# Patient Record
Sex: FEMALE | Race: WHITE | NOT HISPANIC OR LATINO
[De-identification: names, ages, dates, MRNs, and addresses within clinical notes are randomized per-mention and may not be internally consistent; named-entity substitution may affect disease eponyms.]

---

## 2018-10-04 ENCOUNTER — APPOINTMENT (OUTPATIENT)
Dept: BREAST CENTER | Facility: CLINIC | Age: 73
End: 2018-10-04
Payer: MEDICARE

## 2018-10-04 VITALS
HEART RATE: 66 BPM | WEIGHT: 217 LBS | DIASTOLIC BLOOD PRESSURE: 73 MMHG | HEIGHT: 61 IN | BODY MASS INDEX: 40.97 KG/M2 | SYSTOLIC BLOOD PRESSURE: 156 MMHG

## 2018-10-04 DIAGNOSIS — Z83.3 FAMILY HISTORY OF DIABETES MELLITUS: ICD-10-CM

## 2018-10-04 DIAGNOSIS — Z83.1 FAMILY HISTORY OF OTHER INFECTIOUS AND PARASITIC DISEASES: ICD-10-CM

## 2018-10-04 DIAGNOSIS — Z86.79 PERSONAL HISTORY OF OTHER DISEASES OF THE CIRCULATORY SYSTEM: ICD-10-CM

## 2018-10-04 DIAGNOSIS — Z80.51 FAMILY HISTORY OF MALIGNANT NEOPLASM OF KIDNEY: ICD-10-CM

## 2018-10-04 DIAGNOSIS — Z87.891 PERSONAL HISTORY OF NICOTINE DEPENDENCE: ICD-10-CM

## 2018-10-04 DIAGNOSIS — Z12.31 ENCOUNTER FOR SCREENING MAMMOGRAM FOR MALIGNANT NEOPLASM OF BREAST: ICD-10-CM

## 2018-10-04 DIAGNOSIS — Z80.8 FAMILY HISTORY OF MALIGNANT NEOPLASM OF OTHER ORGANS OR SYSTEMS: ICD-10-CM

## 2018-10-04 PROCEDURE — 99214 OFFICE O/P EST MOD 30 MIN: CPT

## 2018-10-04 RX ORDER — LOPERAMIDE HCL 2 MG
2 CAPSULE ORAL
Refills: 0 | Status: ACTIVE | COMMUNITY

## 2018-10-04 RX ORDER — LABETALOL HYDROCHLORIDE 300 MG/1
TABLET, FILM COATED ORAL
Refills: 0 | Status: ACTIVE | COMMUNITY

## 2019-04-04 ENCOUNTER — APPOINTMENT (OUTPATIENT)
Dept: BREAST CENTER | Facility: CLINIC | Age: 74
End: 2019-04-04
Payer: MEDICARE

## 2019-04-04 VITALS
BODY MASS INDEX: 40.59 KG/M2 | HEIGHT: 61 IN | HEART RATE: 75 BPM | DIASTOLIC BLOOD PRESSURE: 75 MMHG | WEIGHT: 215 LBS | SYSTOLIC BLOOD PRESSURE: 152 MMHG

## 2019-04-04 PROCEDURE — 99214 OFFICE O/P EST MOD 30 MIN: CPT

## 2019-04-04 RX ORDER — CHOLECALCIFEROL (VITAMIN D3) 25 MCG
TABLET ORAL
Refills: 0 | Status: ACTIVE | COMMUNITY

## 2019-04-04 RX ORDER — METFORMIN HYDROCHLORIDE 625 MG/1
TABLET ORAL
Refills: 0 | Status: ACTIVE | COMMUNITY

## 2019-04-04 RX ORDER — IBUPROFEN 200 MG/1
200 TABLET, COATED ORAL
Refills: 0 | Status: DISCONTINUED | COMMUNITY
End: 2019-04-04

## 2019-04-04 RX ORDER — IRON/IRON ASP GLY/FA/MV-MIN 38 125-25-1MG
TABLET ORAL
Refills: 0 | Status: ACTIVE | COMMUNITY

## 2019-04-04 RX ORDER — MENTHOL 5.8 MG/1
LOZENGE ORAL
Refills: 0 | Status: ACTIVE | COMMUNITY

## 2019-04-04 RX ORDER — ATORVASTATIN CALCIUM 80 MG/1
TABLET, FILM COATED ORAL
Refills: 0 | Status: ACTIVE | COMMUNITY

## 2019-04-04 RX ORDER — BIOTIN 10 MG
TABLET ORAL
Refills: 0 | Status: ACTIVE | COMMUNITY

## 2019-04-04 RX ORDER — SITAGLIPTIN 100 MG/1
100 TABLET, FILM COATED ORAL
Refills: 0 | Status: DISCONTINUED | COMMUNITY
End: 2019-04-04

## 2019-04-04 NOTE — HISTORY OF PRESENT ILLNESS
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 Diarrhea improved on Imodium\par Saw Bucky Bai/Serena > Pt decided against V. Hernia repair\par Had extensive w/u for elevated tumor marker (?) > colonoscopy/Abd CT/MRI > dil pancreatic duct > followed\par S/P Thyroid FNA (9/27/18): Benign\par No other MH/FH changes. ROS reviewed/discussed. Taking Ca/Vit D/Fe. Bone Densitometry (2015): "WNL"\par Mammo/Sono (8/20/18): NSF

## 2019-04-04 NOTE — PHYSICAL EXAM
[Normocephalic] : normocephalic [Atraumatic] : atraumatic [Supple] : supple [No Supraclavicular Adenopathy] : no supraclavicular adenopathy [No Cervical Adenopathy] : no cervical adenopathy [Normal Sinus Rhythm] : normal sinus rhythm [Examined in the supine and seated position] : examined in the supine and seated position [No dominant masses] : no dominant masses in right breast  [No dominant masses] : no dominant masses left breast [No Nipple Retraction] : no left nipple retraction [No Nipple Discharge] : no left nipple discharge [No Axillary Lymphadenopathy] : no left axillary lymphadenopathy [No Edema] : no edema [No Rashes] : no rashes [No Ulceration] : no ulceration [de-identified] : +R thyroid mass > eval'ed > benign [de-identified] : PMX/SLN/RT. NER. %. No lymphedema.  [de-identified] : +large reducible v. hernia

## 2019-10-10 ENCOUNTER — APPOINTMENT (OUTPATIENT)
Dept: BREAST CENTER | Facility: CLINIC | Age: 74
End: 2019-10-10
Payer: MEDICARE

## 2019-10-10 VITALS
BODY MASS INDEX: 39.65 KG/M2 | HEART RATE: 63 BPM | DIASTOLIC BLOOD PRESSURE: 57 MMHG | HEIGHT: 61 IN | WEIGHT: 210 LBS | SYSTOLIC BLOOD PRESSURE: 135 MMHG

## 2019-10-10 DIAGNOSIS — E04.1 NONTOXIC SINGLE THYROID NODULE: ICD-10-CM

## 2019-10-10 PROCEDURE — 99214 OFFICE O/P EST MOD 30 MIN: CPT

## 2019-10-10 NOTE — PHYSICAL EXAM
[Normocephalic] : normocephalic [Atraumatic] : atraumatic [No Supraclavicular Adenopathy] : no supraclavicular adenopathy [Supple] : supple [No Cervical Adenopathy] : no cervical adenopathy [Normal Sinus Rhythm] : normal sinus rhythm [Examined in the supine and seated position] : examined in the supine and seated position [No dominant masses] : no dominant masses in right breast  [No dominant masses] : no dominant masses left breast [No Nipple Retraction] : no left nipple retraction [No Nipple Discharge] : no left nipple discharge [No Axillary Lymphadenopathy] : no left axillary lymphadenopathy [No Edema] : no edema [No Rashes] : no rashes [No Ulceration] : no ulceration [de-identified] : S/P PMX/SLN/RT. NER. %. No lymphedema.  [de-identified] : +ssame large red v. hernia

## 2019-10-10 NOTE — HISTORY OF PRESENT ILLNESS
[FreeTextEntry1] : S/P L PMX (6/22/10)(Dina): +1.4cm IDCA, SBR I, -LVI, -margins, \par S/P L SLN (7/2/10): -0/5 LN\par L Stage IA IDCA\par S/P RT ([place] [facility])\par S/P Arimidex x 5 yrs ([facility])\par S/P perf AP (6/16) req R colectomy. Diarrhea improved on Imodium\par Saw Bucky Bai/Serena > Pt decided against V. Hernia repair\par Had extensive w/u for elevated tumor marker (?) > colonoscopy/Abd CT/MRI > dil pancreatic duct > followed\par S/P Thyroid FNA (9/27/18): Benign\par No other MH/FH changes. ROS reviewed/discussed. Taking Ca/Vit D/Fe. Bone Densitometry (2015): "WNL"\par Mammo/Sono (9/4/19): HD, NSF

## 2020-04-23 ENCOUNTER — APPOINTMENT (OUTPATIENT)
Dept: BREAST CENTER | Facility: CLINIC | Age: 75
End: 2020-04-23

## 2020-08-05 ENCOUNTER — APPOINTMENT (OUTPATIENT)
Dept: BREAST CENTER | Facility: CLINIC | Age: 75
End: 2020-08-05
Payer: MEDICARE

## 2020-08-05 ENCOUNTER — TRANSCRIPTION ENCOUNTER (OUTPATIENT)
Age: 75
End: 2020-08-05

## 2020-08-05 VITALS
BODY MASS INDEX: 41.23 KG/M2 | WEIGHT: 210 LBS | SYSTOLIC BLOOD PRESSURE: 150 MMHG | DIASTOLIC BLOOD PRESSURE: 76 MMHG | HEART RATE: 68 BPM | HEIGHT: 60 IN

## 2020-08-05 DIAGNOSIS — N64.4 MASTODYNIA: ICD-10-CM

## 2020-08-05 DIAGNOSIS — R92.2 INCONCLUSIVE MAMMOGRAM: ICD-10-CM

## 2020-08-05 DIAGNOSIS — K43.9 VENTRAL HERNIA W/OUT OBSTRUCTION OR GANGRENE: ICD-10-CM

## 2020-08-05 DIAGNOSIS — Z80.3 FAMILY HISTORY OF MALIGNANT NEOPLASM OF BREAST: ICD-10-CM

## 2020-08-05 DIAGNOSIS — C50.912 MALIGNANT NEOPLASM OF UNSPECIFIED SITE OF LEFT FEMALE BREAST: ICD-10-CM

## 2020-08-05 PROCEDURE — 99214 OFFICE O/P EST MOD 30 MIN: CPT

## 2020-08-05 RX ORDER — VITAMIN B COMPLEX
CAPSULE ORAL
Refills: 0 | Status: DISCONTINUED | COMMUNITY
End: 2020-08-05

## 2020-08-05 RX ORDER — REPAGLINIDE 1 MG/1
TABLET ORAL
Refills: 0 | Status: ACTIVE | COMMUNITY

## 2020-08-05 RX ORDER — OXYBUTYNIN CHLORIDE 2.5 MG/1
TABLET ORAL
Refills: 0 | Status: ACTIVE | COMMUNITY

## 2020-08-05 RX ORDER — PNV NO.95/FERROUS FUM/FOLIC AC 28MG-0.8MG
TABLET ORAL
Refills: 0 | Status: DISCONTINUED | COMMUNITY
End: 2020-08-05

## 2020-08-05 RX ORDER — OLMESARTAN MEDOXOMIL 20 MG/1
20 TABLET, FILM COATED ORAL
Refills: 0 | Status: DISCONTINUED | COMMUNITY
End: 2020-08-05

## 2020-08-05 RX ORDER — TELMISARTAN 40 MG/1
40 TABLET ORAL
Refills: 0 | Status: ACTIVE | COMMUNITY

## 2020-08-05 RX ORDER — VITAMIN E ACID SUCCINATE 268 MG
TABLET ORAL
Refills: 0 | Status: DISCONTINUED | COMMUNITY
End: 2020-08-05

## 2020-08-05 RX ORDER — BLACK COHOSH ROOT 200 MG
CAPSULE ORAL
Refills: 0 | Status: DISCONTINUED | COMMUNITY
End: 2020-08-05

## 2020-08-05 NOTE — PHYSICAL EXAM
[Normocephalic] : normocephalic [Atraumatic] : atraumatic [Supple] : supple [No Supraclavicular Adenopathy] : no supraclavicular adenopathy [No Cervical Adenopathy] : no cervical adenopathy [No Thyromegaly] : no thyromegaly [Normal Sinus Rhythm] : normal sinus rhythm [Examined in the supine and seated position] : examined in the supine and seated position [No dominant masses] : no dominant masses in right breast  [No dominant masses] : no dominant masses left breast [No Nipple Retraction] : no left nipple retraction [No Nipple Discharge] : no left nipple discharge [No Axillary Lymphadenopathy] : no left axillary lymphadenopathy [No Rashes] : no rashes [No Edema] : no edema [No Ulceration] : no ulceration [de-identified] : S/P PMX/SLN/RT. NER. %. No lymphedema.

## 2020-08-05 NOTE — HISTORY OF PRESENT ILLNESS
[FreeTextEntry1] : Pt RTO w/ c/o L Br Pain, int x 2 wks\par S/P L PMX (6/22/10)(Dina): +1.4cm IDCA, SBR I, -LVI, -margins, \par S/P L SLN (7/2/10): -0/5 LN\par L Stage IA IDCA\par S/P RT ([facility])\par S/P Arimidex x 5 yrs ([facility] [place])\par S/P perf AP (6/16) req R colectomy. Diarrhea improved on Imodium\par Saw Drs Richardson/Serena > Pt decided against V. Hernia repair\par Had extensive w/u for elevated tumor marker (?) > colonoscopy/Abd CT/MRI > dil pancreatic duct > followed\par S/P Thyroid FNA (9/27/18): Benign\par No other MH/FH changes. ROS reviewed/discussed. Taking Ca/Vit D/Fe. Bone Densitometry (2015): "WNL"\par Mammo/Sono (9/4/19): HD, NSF

## 2021-02-17 ENCOUNTER — NON-APPOINTMENT (OUTPATIENT)
Age: 76
End: 2021-02-17

## 2021-02-18 ENCOUNTER — APPOINTMENT (OUTPATIENT)
Dept: BREAST CENTER | Facility: CLINIC | Age: 76
End: 2021-02-18

## 2024-10-17 ENCOUNTER — APPOINTMENT (OUTPATIENT)
Dept: NEUROSURGERY | Facility: CLINIC | Age: 79
End: 2024-10-17
Payer: MEDICARE

## 2024-10-17 ENCOUNTER — NON-APPOINTMENT (OUTPATIENT)
Age: 79
End: 2024-10-17

## 2024-10-17 VITALS
HEIGHT: 60 IN | OXYGEN SATURATION: 98 % | HEART RATE: 91 BPM | RESPIRATION RATE: 16 BRPM | WEIGHT: 210 LBS | BODY MASS INDEX: 41.23 KG/M2 | TEMPERATURE: 97.5 F | DIASTOLIC BLOOD PRESSURE: 95 MMHG | SYSTOLIC BLOOD PRESSURE: 127 MMHG

## 2024-10-17 DIAGNOSIS — M47.816 SPONDYLOSIS W/OUT MYELOPATHY OR RADICULOPATHY, LUMBAR REGION: ICD-10-CM

## 2024-10-17 PROCEDURE — 99203 OFFICE O/P NEW LOW 30 MIN: CPT

## 2024-11-21 ENCOUNTER — APPOINTMENT (OUTPATIENT)
Dept: NEUROSURGERY | Facility: CLINIC | Age: 79
End: 2024-11-21
Payer: MEDICARE

## 2024-11-21 VITALS
DIASTOLIC BLOOD PRESSURE: 95 MMHG | TEMPERATURE: 97.5 F | BODY MASS INDEX: 41.23 KG/M2 | RESPIRATION RATE: 16 BRPM | HEIGHT: 60 IN | WEIGHT: 210 LBS | SYSTOLIC BLOOD PRESSURE: 127 MMHG | HEART RATE: 91 BPM | OXYGEN SATURATION: 98 %

## 2024-11-21 DIAGNOSIS — M47.816 SPONDYLOSIS W/OUT MYELOPATHY OR RADICULOPATHY, LUMBAR REGION: ICD-10-CM

## 2024-11-21 PROCEDURE — 99214 OFFICE O/P EST MOD 30 MIN: CPT

## 2025-01-23 VITALS
SYSTOLIC BLOOD PRESSURE: 152 MMHG | OXYGEN SATURATION: 99 % | DIASTOLIC BLOOD PRESSURE: 76 MMHG | WEIGHT: 196.21 LBS | TEMPERATURE: 98 F | HEART RATE: 59 BPM | HEIGHT: 60 IN | RESPIRATION RATE: 16 BRPM

## 2025-01-23 NOTE — PATIENT PROFILE ADULT - FALL HARM RISK - FACTORS NURSING JUDGEMENT
influenza, injectable, quadrivalent, preservative free; 18-Dec-2020 13:35; Pedro Calix (RN); [employer]em; [id number] (Exp. Date: 30-Jun-2021); IntraMuscular; Deltoid Left.; 0.5 milliLiter(s); VIS (VIS Published: 15-Aug-2019, VIS Presented: 18-Dec-2020);    Yes

## 2025-01-23 NOTE — PATIENT PROFILE ADULT - FALL HARM RISK - HARM RISK INTERVENTIONS

## 2025-01-24 ENCOUNTER — INPATIENT (INPATIENT)
Facility: HOSPITAL | Age: 80
LOS: 5 days | Discharge: NURSING, REHAB OR LONG-TERM CARE | End: 2025-01-30
Attending: NEUROLOGICAL SURGERY | Admitting: NEUROLOGICAL SURGERY
Payer: MEDICARE

## 2025-01-24 ENCOUNTER — APPOINTMENT (OUTPATIENT)
Dept: NEUROSURGERY | Facility: HOSPITAL | Age: 80
End: 2025-01-24

## 2025-01-24 ENCOUNTER — TRANSCRIPTION ENCOUNTER (OUTPATIENT)
Age: 80
End: 2025-01-24

## 2025-01-24 DIAGNOSIS — R93.3 ABNORMAL FINDINGS ON DIAGNOSTIC IMAGING OF OTHER PARTS OF DIGESTIVE TRACT: Chronic | ICD-10-CM

## 2025-01-24 DIAGNOSIS — Z90.49 ACQUIRED ABSENCE OF OTHER SPECIFIED PARTS OF DIGESTIVE TRACT: Chronic | ICD-10-CM

## 2025-01-24 DIAGNOSIS — Z98.890 OTHER SPECIFIED POSTPROCEDURAL STATES: Chronic | ICD-10-CM

## 2025-01-24 LAB
ADD ON TEST-SPECIMEN IN LAB: SIGNIFICANT CHANGE UP
ANION GAP SERPL CALC-SCNC: 10 MMOL/L — SIGNIFICANT CHANGE UP (ref 5–17)
BASOPHILS # BLD AUTO: 0.01 K/UL — SIGNIFICANT CHANGE UP (ref 0–0.2)
BASOPHILS NFR BLD AUTO: 0.2 % — SIGNIFICANT CHANGE UP (ref 0–2)
BLD GP AB SCN SERPL QL: NEGATIVE — SIGNIFICANT CHANGE UP
BUN SERPL-MCNC: 26 MG/DL — HIGH (ref 7–23)
CALCIUM SERPL-MCNC: 8.5 MG/DL — SIGNIFICANT CHANGE UP (ref 8.4–10.5)
CHLORIDE SERPL-SCNC: 108 MMOL/L — SIGNIFICANT CHANGE UP (ref 96–108)
CO2 SERPL-SCNC: 22 MMOL/L — SIGNIFICANT CHANGE UP (ref 22–31)
CREAT SERPL-MCNC: 1.16 MG/DL — SIGNIFICANT CHANGE UP (ref 0.5–1.3)
EGFR: 48 ML/MIN/1.73M2 — LOW
EOSINOPHIL # BLD AUTO: 0.02 K/UL — SIGNIFICANT CHANGE UP (ref 0–0.5)
EOSINOPHIL NFR BLD AUTO: 0.4 % — SIGNIFICANT CHANGE UP (ref 0–6)
GLUCOSE SERPL-MCNC: 185 MG/DL — HIGH (ref 70–99)
HCT VFR BLD CALC: 29.1 % — LOW (ref 34.5–45)
HGB BLD-MCNC: 9.5 G/DL — LOW (ref 11.5–15.5)
IMM GRANULOCYTES NFR BLD AUTO: 1.1 % — HIGH (ref 0–0.9)
LYMPHOCYTES # BLD AUTO: 0.7 K/UL — LOW (ref 1–3.3)
LYMPHOCYTES # BLD AUTO: 12.7 % — LOW (ref 13–44)
MCHC RBC-ENTMCNC: 26.3 PG — LOW (ref 27–34)
MCHC RBC-ENTMCNC: 32.6 G/DL — SIGNIFICANT CHANGE UP (ref 32–36)
MCV RBC AUTO: 80.6 FL — SIGNIFICANT CHANGE UP (ref 80–100)
MONOCYTES # BLD AUTO: 0.08 K/UL — SIGNIFICANT CHANGE UP (ref 0–0.9)
MONOCYTES NFR BLD AUTO: 1.4 % — LOW (ref 2–14)
NEUTROPHILS # BLD AUTO: 4.66 K/UL — SIGNIFICANT CHANGE UP (ref 1.8–7.4)
NEUTROPHILS NFR BLD AUTO: 84.2 % — HIGH (ref 43–77)
NRBC # BLD: 0 /100 WBCS — SIGNIFICANT CHANGE UP (ref 0–0)
NRBC BLD-RTO: 0 /100 WBCS — SIGNIFICANT CHANGE UP (ref 0–0)
PLATELET # BLD AUTO: 182 K/UL — SIGNIFICANT CHANGE UP (ref 150–400)
POTASSIUM SERPL-MCNC: 4 MMOL/L — SIGNIFICANT CHANGE UP (ref 3.5–5.3)
POTASSIUM SERPL-SCNC: 4 MMOL/L — SIGNIFICANT CHANGE UP (ref 3.5–5.3)
RBC # BLD: 3.61 M/UL — LOW (ref 3.8–5.2)
RBC # FLD: 14.1 % — SIGNIFICANT CHANGE UP (ref 10.3–14.5)
RH IG SCN BLD-IMP: POSITIVE — SIGNIFICANT CHANGE UP
SODIUM SERPL-SCNC: 140 MMOL/L — SIGNIFICANT CHANGE UP (ref 135–145)
WBC # BLD: 5.53 K/UL — SIGNIFICANT CHANGE UP (ref 3.8–10.5)
WBC # FLD AUTO: 5.53 K/UL — SIGNIFICANT CHANGE UP (ref 3.8–10.5)

## 2025-01-24 PROCEDURE — 22633 ARTHRD CMBN 1NTRSPC LUMBAR: CPT | Mod: 80

## 2025-01-24 PROCEDURE — 63052 LAM FACETC/FRMT ARTHRD LUM 1: CPT | Mod: 59

## 2025-01-24 PROCEDURE — 63047 LAM FACETEC & FORAMOT LUMBAR: CPT | Mod: 59

## 2025-01-24 PROCEDURE — 22853 INSJ BIOMECHANICAL DEVICE: CPT

## 2025-01-24 PROCEDURE — 63052 LAM FACETC/FRMT ARTHRD LUM 1: CPT | Mod: 80,59

## 2025-01-24 PROCEDURE — 22840 INSERT SPINE FIXATION DEVICE: CPT | Mod: 80

## 2025-01-24 PROCEDURE — 22633 ARTHRD CMBN 1NTRSPC LUMBAR: CPT

## 2025-01-24 PROCEDURE — 22840 INSERT SPINE FIXATION DEVICE: CPT

## 2025-01-24 PROCEDURE — 63047 LAM FACETEC & FORAMOT LUMBAR: CPT | Mod: 80,59

## 2025-01-24 PROCEDURE — 61783 SCAN PROC SPINAL: CPT | Mod: 59

## 2025-01-24 PROCEDURE — 22853 INSJ BIOMECHANICAL DEVICE: CPT | Mod: 80

## 2025-01-24 DEVICE — ROD CONTOURED 5.5X40MM: Type: IMPLANTABLE DEVICE | Status: FUNCTIONAL

## 2025-01-24 DEVICE — CAGE HYPERLORDATIC FORZA 8.5X10X24MM: Type: IMPLANTABLE DEVICE | Status: FUNCTIONAL

## 2025-01-24 DEVICE — SURGIFLO HEMOSTATIC MATRIX KIT: Type: IMPLANTABLE DEVICE | Status: FUNCTIONAL

## 2025-01-24 DEVICE — SURGIFOAM PAD 2CM X 6CM X 7MM (12-7): Type: IMPLANTABLE DEVICE | Status: FUNCTIONAL

## 2025-01-24 DEVICE — SET SCREW EVEREST ATR: Type: IMPLANTABLE DEVICE | Status: FUNCTIONAL

## 2025-01-24 DEVICE — SCREW EVEREST POLY 6.5X40MM: Type: IMPLANTABLE DEVICE | Status: FUNCTIONAL

## 2025-01-24 RX ORDER — HYDROCHLOROTHIAZIDE 50 MG
1 TABLET ORAL
Refills: 0 | DISCHARGE

## 2025-01-24 RX ORDER — ACETAMINOPHEN 160 MG/5ML
2 SUSPENSION ORAL
Refills: 0 | DISCHARGE

## 2025-01-24 RX ORDER — LOSARTAN POTASSIUM 100 MG
100 TABLET ORAL DAILY
Refills: 0 | Status: DISCONTINUED | OUTPATIENT
Start: 2025-01-24 | End: 2025-01-25

## 2025-01-24 RX ORDER — METFORMIN HYDROCHLORIDE 1000 MG/1
1 TABLET, COATED ORAL
Refills: 0 | DISCHARGE

## 2025-01-24 RX ORDER — PREGABALIN CAPSULES, CV 225 MG/1
50 CAPSULE ORAL THREE TIMES A DAY
Refills: 0 | Status: DISCONTINUED | OUTPATIENT
Start: 2025-01-24 | End: 2025-01-27

## 2025-01-24 RX ORDER — HYDROMORPHONE HYDROCHLORIDE 4 MG/ML
0.5 INJECTION, SOLUTION INTRAMUSCULAR; INTRAVENOUS; SUBCUTANEOUS EVERY 4 HOURS
Refills: 0 | Status: DISCONTINUED | OUTPATIENT
Start: 2025-01-24 | End: 2025-01-27

## 2025-01-24 RX ORDER — DM/PSEUDOEPHED/ACETAMINOPHEN 10-30-250
12.5 CAPSULE ORAL ONCE
Refills: 0 | Status: DISCONTINUED | OUTPATIENT
Start: 2025-01-24 | End: 2025-01-25

## 2025-01-24 RX ORDER — SODIUM CHLORIDE 9 G/ML
1000 INJECTION, SOLUTION INTRAVENOUS
Refills: 0 | Status: DISCONTINUED | OUTPATIENT
Start: 2025-01-24 | End: 2025-01-25

## 2025-01-24 RX ORDER — ONDANSETRON 4 MG/1
4 TABLET, ORALLY DISINTEGRATING ORAL EVERY 6 HOURS
Refills: 0 | Status: DISCONTINUED | OUTPATIENT
Start: 2025-01-24 | End: 2025-01-30

## 2025-01-24 RX ORDER — MELOXICAM 7.5 MG
1 TABLET ORAL
Refills: 0 | DISCHARGE

## 2025-01-24 RX ORDER — ACETAMINOPHEN 160 MG/5ML
1000 SUSPENSION ORAL ONCE
Refills: 0 | Status: COMPLETED | OUTPATIENT
Start: 2025-01-24 | End: 2025-01-24

## 2025-01-24 RX ORDER — LABETALOL HYDROCHLORIDE 300 MG/1
100 TABLET, FILM COATED ORAL
Refills: 0 | Status: DISCONTINUED | OUTPATIENT
Start: 2025-01-24 | End: 2025-01-25

## 2025-01-24 RX ORDER — GLUCAGON 3 MG/1
1 POWDER NASAL ONCE
Refills: 0 | Status: DISCONTINUED | OUTPATIENT
Start: 2025-01-24 | End: 2025-01-25

## 2025-01-24 RX ORDER — AMLODIPINE BESYLATE 5 MG
10 TABLET ORAL DAILY
Refills: 0 | Status: DISCONTINUED | OUTPATIENT
Start: 2025-01-24 | End: 2025-01-25

## 2025-01-24 RX ORDER — OXYCODONE HYDROCHLORIDE 30 MG/1
5 TABLET ORAL EVERY 4 HOURS
Refills: 0 | Status: DISCONTINUED | OUTPATIENT
Start: 2025-01-24 | End: 2025-01-30

## 2025-01-24 RX ORDER — APREPITANT 40 MG/1
40 CAPSULE ORAL ONCE
Refills: 0 | Status: COMPLETED | OUTPATIENT
Start: 2025-01-24 | End: 2025-01-24

## 2025-01-24 RX ORDER — LOPERAMIDE HYDROCHLORIDE 2 MG/1
1 CAPSULE ORAL
Refills: 0 | DISCHARGE

## 2025-01-24 RX ORDER — CEFAZOLIN SODIUM IN 0.9 % NACL 2 G/10 ML
2000 SYRINGE (ML) INTRAVENOUS EVERY 8 HOURS
Refills: 0 | Status: COMPLETED | OUTPATIENT
Start: 2025-01-24 | End: 2025-01-25

## 2025-01-24 RX ORDER — METHOCARBAMOL 500 MG
500 TABLET ORAL EVERY 8 HOURS
Refills: 0 | Status: DISCONTINUED | OUTPATIENT
Start: 2025-01-24 | End: 2025-01-30

## 2025-01-24 RX ORDER — DM/PSEUDOEPHED/ACETAMINOPHEN 10-30-250
15 CAPSULE ORAL ONCE
Refills: 0 | Status: DISCONTINUED | OUTPATIENT
Start: 2025-01-24 | End: 2025-01-25

## 2025-01-24 RX ORDER — INSULIN LISPRO 100/ML
VIAL (ML) SUBCUTANEOUS
Refills: 0 | Status: DISCONTINUED | OUTPATIENT
Start: 2025-01-24 | End: 2025-01-30

## 2025-01-24 RX ORDER — DM/PSEUDOEPHED/ACETAMINOPHEN 10-30-250
25 CAPSULE ORAL ONCE
Refills: 0 | Status: DISCONTINUED | OUTPATIENT
Start: 2025-01-24 | End: 2025-01-25

## 2025-01-24 RX ORDER — ACETAMINOPHEN 160 MG/5ML
1000 SUSPENSION ORAL EVERY 8 HOURS
Refills: 0 | Status: DISCONTINUED | OUTPATIENT
Start: 2025-01-24 | End: 2025-01-30

## 2025-01-24 RX ORDER — FERROUS SULFATE 325(65) MG
0 TABLET ORAL
Refills: 0 | DISCHARGE

## 2025-01-24 RX ORDER — PANTOPRAZOLE 20 MG/1
1 TABLET, DELAYED RELEASE ORAL
Refills: 0 | DISCHARGE

## 2025-01-24 RX ORDER — ANTISEPTIC SURGICAL SCRUB 0.04 MG/ML
1 SOLUTION TOPICAL EVERY 12 HOURS
Refills: 0 | Status: DISCONTINUED | OUTPATIENT
Start: 2025-01-24 | End: 2025-01-24

## 2025-01-24 RX ORDER — PANTOPRAZOLE 20 MG/1
40 TABLET, DELAYED RELEASE ORAL
Refills: 0 | Status: DISCONTINUED | OUTPATIENT
Start: 2025-01-24 | End: 2025-01-30

## 2025-01-24 RX ORDER — BACTERIOSTATIC SODIUM CHLORIDE 0.9 %
1000 VIAL (ML) INJECTION
Refills: 0 | Status: DISCONTINUED | OUTPATIENT
Start: 2025-01-24 | End: 2025-01-25

## 2025-01-24 RX ORDER — INSULIN LISPRO 100/ML
VIAL (ML) SUBCUTANEOUS AT BEDTIME
Refills: 0 | Status: DISCONTINUED | OUTPATIENT
Start: 2025-01-24 | End: 2025-01-25

## 2025-01-24 RX ORDER — POVIDONE-IODINE 0.01 ML/1
1 SWAB TOPICAL ONCE
Refills: 0 | Status: COMPLETED | OUTPATIENT
Start: 2025-01-24 | End: 2025-01-24

## 2025-01-24 RX ADMIN — Medication 100 MILLIGRAM(S): at 22:58

## 2025-01-24 RX ADMIN — Medication 75 MILLILITER(S): at 21:15

## 2025-01-24 RX ADMIN — ANTISEPTIC SURGICAL SCRUB 1 APPLICATION(S): 0.04 SOLUTION TOPICAL at 08:50

## 2025-01-24 RX ADMIN — APREPITANT 40 MILLIGRAM(S): 40 CAPSULE ORAL at 08:49

## 2025-01-24 RX ADMIN — ACETAMINOPHEN 1000 MILLIGRAM(S): 160 SUSPENSION ORAL at 23:58

## 2025-01-24 RX ADMIN — PREGABALIN CAPSULES, CV 50 MILLIGRAM(S): 225 CAPSULE ORAL at 22:58

## 2025-01-24 RX ADMIN — ACETAMINOPHEN 1000 MILLIGRAM(S): 160 SUSPENSION ORAL at 08:49

## 2025-01-24 RX ADMIN — POVIDONE-IODINE 1 APPLICATION(S): 0.01 SWAB TOPICAL at 08:50

## 2025-01-24 RX ADMIN — ACETAMINOPHEN 1000 MILLIGRAM(S): 160 SUSPENSION ORAL at 22:58

## 2025-01-24 RX ADMIN — Medication 500 MILLIGRAM(S): at 22:59

## 2025-01-24 NOTE — H&P ADULT - HISTORY OF PRESENT ILLNESS
79F hx HLD, CAD, DM, HTN, low back pain with neurogenic claudication and RLE radiculopathy. Imaging with progressive L5-S1 spondy with severe stenosis L2-S1. Failed conservative management (extensive PT and multiple rounds of injections including ANNA, trigger points and RFA).

## 2025-01-24 NOTE — H&P ADULT - NSHPPHYSICALEXAM_GEN_ALL_CORE
General - well appearing in no acute distress  HEENT - normocephalic, atraumatic  Skin - intact  CV - no lolita  Lungs - no wheezing or cyanosis  Musculoskeletal - significant decreased ROM; no tenderness to palpation; negative Spurlings; negative Lhermittes;negative straight leg raise; negative FABERs; negative Tinels  Neuro - AOx3, CN intact, HAWKINS 5/5, normoreflexive, no Hoffmans or clonus, gait intact

## 2025-01-24 NOTE — BRIEF OPERATIVE NOTE - COMMENTS
I, Margarette Khan MD was present to assist Dr. Goodwin as resident was not qualified to first assist on this particular procedure.

## 2025-01-24 NOTE — PRE-ANESTHESIA EVALUATION ADULT - NSANTHPMHFT_GEN_ALL_CORE
s/p left breast lumpectomy/XRT    CAD? pt denies.  reported on admission H&P.  cardiologist reports NL NST 2 yrs ago and NL biventricular function on recent TTE, and NL ECG.,  No reference to history or evidence of CAD

## 2025-01-24 NOTE — H&P ADULT - ASSESSMENT
79F hx HLD, CAD, DM, HTN, low back pain with neurogenic claudication and RLE radiculopathy. Imaging with progressive L5-S1 spondy with severe stenosis L2-S1. Failed conservative management (extensive PT and multiple rounds of injections including ANNA, trigger points and RFA).    Today, presents for L3-4 lami and L5-S1 fusion.    - adm to Dr. Goodwin  - pain control  - PT/OT

## 2025-01-24 NOTE — PROGRESS NOTE ADULT - SUBJECTIVE AND OBJECTIVE BOX
NEUROSURGERY POST OP NOTE:    POD# 0 S/P    S:       T(C): 36.8 (01-25-25 @ 00:27), Max: 36.8 (01-25-25 @ 00:27)  HR: 71 (01-25-25 @ 00:27) (59 - 84)  BP: 93/50 (01-25-25 @ 00:27) (93/50 - 152/76)  RR: 18 (01-25-25 @ 00:27) (12 - 20)  SpO2: 96% (01-25-25 @ 00:27) (89% - 99%)      01-24-25 @ 07:01  -  01-25-25 @ 01:08  --------------------------------------------------------  IN: 600 mL / OUT: 370 mL / NET: 230 mL        acetaminophen     Tablet .. 1000 milliGRAM(s) Oral every 8 hours  amLODIPine   Tablet 10 milliGRAM(s) Oral daily  ceFAZolin   IVPB 2000 milliGRAM(s) IV Intermittent every 8 hours  dextrose 5%. 1000 milliLiter(s) IV Continuous <Continuous>  dextrose 5%. 1000 milliLiter(s) IV Continuous <Continuous>  dextrose 50% Injectable 25 Gram(s) IV Push once  dextrose 50% Injectable 12.5 Gram(s) IV Push once  dextrose 50% Injectable 25 Gram(s) IV Push once  dextrose Oral Gel 15 Gram(s) Oral once PRN  glucagon  Injectable 1 milliGRAM(s) IntraMuscular once  HYDROmorphone  Injectable 0.5 milliGRAM(s) IV Push every 4 hours PRN  insulin lispro (ADMELOG) corrective regimen sliding scale   SubCutaneous three times a day before meals  insulin lispro (ADMELOG) corrective regimen sliding scale   SubCutaneous at bedtime  labetalol 100 milliGRAM(s) Oral two times a day  losartan 100 milliGRAM(s) Oral daily  methocarbamol 500 milliGRAM(s) Oral every 8 hours  ondansetron   Disintegrating Tablet 4 milliGRAM(s) Oral every 6 hours  oxyCODONE    IR 5 milliGRAM(s) Oral every 4 hours PRN  pantoprazole    Tablet 40 milliGRAM(s) Oral before breakfast  pregabalin 50 milliGRAM(s) Oral three times a day  sodium chloride 0.9%. 1000 milliLiter(s) IV Continuous <Continuous>      RADIOLOGY:   pending standing x-rays    Exam:  General: NAD, pt is comfortably sitting up in bed, on NC   HEENT: PERRL 3mm briskly reactive, EOMI b/l, face symmetric, tongue midline, neck FROM  Cardiovascular: RRR, S1, S2  Respiratory: non-labored breathing on NC, chest rise symmetric  GI: abd soft, NTND   Neuro: A&Ox3, No aphasia, speech clear, no dysmetria, no pronator drift. Follows commands.  HAWKINS x4 spontaneously, 5/5 strength in all extremities throughout. Sensation intact  Extremities: distal pulses 2+ x4  Wound/incision: lumbar incision dressing c/d/i   Drain: HMV x2, dressings c/d/i     Assessment: 79F hx HLD, CAD, DM, HTN, low back pain with neurogenic claudication and RLE radiculopathy. Imaging with progressive L5-S1 spondy with severe stenosis L2-S1. Failed conservative management (extensive PT and multiple rounds of injections including ANNA, trigger points and RFA).  Now s/p L3-4 lami, L5-S1 TLIF.       Plan:  NEURO:  - neuro/vitals q4  - pain control w ERAS   - pending standing x-rays   - HMV x2, monitor output    CARDS:  - -160   - hx HTN: c/w home labetalol, c/w amlodipine, c/w home losartan    PULM:  - NC prn, encourage IS   - hx ALIZE    GI:  - ADAT to CCD  - bowel reg   - c/w home protonix    RENAL:  - IVF until adequate PO intake   - (+) krystal      ENDO:  - ISS  - hx of DM: hold home metformin     HEME:  - DVT ppx w SCDs, holding SQL post op     ID:  - afebrile  - post op ancef     DISPO: telemetry, pending PT/OT     D/w Dr. Goodwin   NEUROSURGERY POST OP NOTE:    POD# 0 S/P L3-4 lami, L5-S1 TLIF.     S: Seen and examined in PACU. Reports incisional pain, improving with meds. Denies HA, N/V, chest pain, shortness of breath, new weakness or numbness.       T(C): 36.8 (01-25-25 @ 00:27), Max: 36.8 (01-25-25 @ 00:27)  HR: 71 (01-25-25 @ 00:27) (59 - 84)  BP: 93/50 (01-25-25 @ 00:27) (93/50 - 152/76)  RR: 18 (01-25-25 @ 00:27) (12 - 20)  SpO2: 96% (01-25-25 @ 00:27) (89% - 99%)      01-24-25 @ 07:01  -  01-25-25 @ 01:08  --------------------------------------------------------  IN: 600 mL / OUT: 370 mL / NET: 230 mL        acetaminophen     Tablet .. 1000 milliGRAM(s) Oral every 8 hours  amLODIPine   Tablet 10 milliGRAM(s) Oral daily  ceFAZolin   IVPB 2000 milliGRAM(s) IV Intermittent every 8 hours  dextrose 5%. 1000 milliLiter(s) IV Continuous <Continuous>  dextrose 5%. 1000 milliLiter(s) IV Continuous <Continuous>  dextrose 50% Injectable 25 Gram(s) IV Push once  dextrose 50% Injectable 12.5 Gram(s) IV Push once  dextrose 50% Injectable 25 Gram(s) IV Push once  dextrose Oral Gel 15 Gram(s) Oral once PRN  glucagon  Injectable 1 milliGRAM(s) IntraMuscular once  HYDROmorphone  Injectable 0.5 milliGRAM(s) IV Push every 4 hours PRN  insulin lispro (ADMELOG) corrective regimen sliding scale   SubCutaneous three times a day before meals  insulin lispro (ADMELOG) corrective regimen sliding scale   SubCutaneous at bedtime  labetalol 100 milliGRAM(s) Oral two times a day  losartan 100 milliGRAM(s) Oral daily  methocarbamol 500 milliGRAM(s) Oral every 8 hours  ondansetron   Disintegrating Tablet 4 milliGRAM(s) Oral every 6 hours  oxyCODONE    IR 5 milliGRAM(s) Oral every 4 hours PRN  pantoprazole    Tablet 40 milliGRAM(s) Oral before breakfast  pregabalin 50 milliGRAM(s) Oral three times a day  sodium chloride 0.9%. 1000 milliLiter(s) IV Continuous <Continuous>      RADIOLOGY:   pending standing x-rays    Exam:  General: NAD, pt is comfortably sitting up in bed, on NC   HEENT: PERRL 3mm briskly reactive, EOMI b/l, face symmetric, tongue midline, neck FROM  Cardiovascular: RRR, S1, S2  Respiratory: non-labored breathing on NC, chest rise symmetric  GI: abd soft, NTND   Neuro: A&Ox3, No aphasia, speech clear, no dysmetria, no pronator drift. Follows commands.  HAWKINS x4 spontaneously, 5/5 strength in all extremities throughout. Sensation intact  Extremities: distal pulses 2+ x4  Wound/incision: lumbar incision dressing c/d/i   Drain: HMV x2, dressings c/d/i     Assessment: 79F hx HLD, CAD, DM, HTN, low back pain with neurogenic claudication and RLE radiculopathy. Imaging with progressive L5-S1 spondy with severe stenosis L2-S1. Failed conservative management (extensive PT and multiple rounds of injections including ANNA, trigger points and RFA).  Now s/p L3-4 lami, L5-S1 TLIF.       Plan:  NEURO:  - neuro/vitals q4  - pain control w ERAS   - pending standing x-rays   - HMV x2, monitor output    CARDS:  - -160   - hx HTN: c/w home labetalol, c/w amlodipine, c/w home losartan    PULM:  - NC prn, encourage IS   - hx ALIZE    GI:  - ADAT to CCD  - bowel reg   - c/w home protonix    RENAL:  - IVF until adequate PO intake   - (+) rice      ENDO:  - ISS  - hx of DM: hold home metformin     HEME:  - DVT ppx w SCDs, holding SQL post op     ID:  - afebrile  - post op ancef     DISPO: telemetry, pending PT/OT     D/w Dr. Goodwin

## 2025-01-24 NOTE — PRE-ANESTHESIA EVALUATION ADULT - NSANTHADDINFOFT_GEN_ALL_CORE
H&P Adult [Charted Location: [address] 04] [Authored: 24-Jan-2025 12:39]- for Visit: [id number], In Progress, Revised, Signed w/additional Signatures Pending, Available to Patient    History and Physical:   Source of Information	Chart(s), Patient       Patient Identity:  · Birth Sex	Female     History of Present Illness:  Reason for Admission: L3-4 laminectomy; L5-S1 fusion  History of Present Illness:   79F hx HLD, CAD, DM, HTN, low back pain with neurogenic claudication and RLE radiculopathy. Imaging with progressive L5-S1 spondy with severe stenosis L2-S1. Failed conservative management (extensive PT and multiple rounds of injections including ANNA, trigger points and RFA).         Review of Systems:  Other Review of Systems: All other review of systems negative, except as noted in HPI      Allergies and Intolerances:        Allergies:  	No Known Allergies:     Home Medications:   * Patient Currently Takes Medications as of 24-Jan-2025 08:44 documented in Structured Notes  · 	labetalol 100 mg oral tablet: Last Dose Taken: 24-Jan-2025 AM, 1 tab(s) orally 2 times a day  · 	hydroCHLOROthiazide 25 mg oral tablet: Last Dose Taken: 23-Jan-2025 AM, 1 tab(s) orally once a day  · 	amLODIPine 10 mg oral tablet: Last Dose Taken: 23-Jan-2025 PM, 1 tab(s) orally once a day  · 	Imodium 2 mg oral capsule: Last Dose Taken: 23-Jan-2025 PM, 1 cap(s) orally prn  · 	metFORMIN 500 mg oral tablet: Last Dose Taken: 23-Jan-2025 PM, 1 tab(s) orally 2 times a day  · 	ferrous sulfate: Last Dose Taken: 23-Jan-2025 AM, once a day 325mg  · 	telmisartan 80 mg oral tablet: Last Dose Taken: 23-Jan-2025 AM, 1 tab(s) orally once a day  · 	pantoprazole 40 mg oral delayed release tablet: Last Dose Taken: 24-Jan-2025 AM, 1 tab(s) orally once a day    Patient History:    Social History:  · Substance use	No     Tobacco Screening:  · Core Measure Site	No    Risk Assessment:    Present on Admission:  Deep Venous Thrombosis	no  Pulmonary Embolus	no     HIV Screening:  · In accordance with [place] State law, we offer every patient who comes to our ED an HIV test. Would you like to be tested today?	Opt out     Hepatitis C Test Questions:  · In accordance with [place] State Law, we offer every patient a Hepatitis C test. Would you like to be tested today?	Opt out    Physical Exam:   Physical Exam: General - well appearing in no acute distress  HEENT - normocephalic, atraumatic  Skin - intact  CV - no lolita  Lungs - no wheezing or cyanosis  Musculoskeletal - significant decreased ROM; no tenderness to palpation; negative Spurlings; negative Lhermittes;negative straight leg raise; negative FABERs; negative Tinels  Neuro - AOx3, CN intact, HAWKINS 5/5, normoreflexive, no Hoffmans or clonus, gait intact    Assessment and Plan:   · VTE Risk Assessment	VTE Assessment already completed for this visit  · Completed VTE Risk Assessment(s)	Refer to the Assessment tab to view/cancel completed assessment.     Assessment:  · Assessment	  79F hx HLD, CAD, DM, HTN, low back pain with neurogenic claudication and RLE radiculopathy. Imaging with progressive L5-S1 spondy with severe stenosis L2-S1. Failed conservative management (extensive PT and multiple rounds of injections including ANNA, trigger points and RFA).    Today, presents for L3-4 lami and L5-S1 fusion.    - adm to Dr. Goodwin  - pain control  - PT/OT            Electronic Signatures:  Milady Goodwin)  (Signature Pending)  	Co-Signer: History and Physical, History of Present Illness, Allergies/Medications, Patient History, Risk Assessment, Assessment and Plan  Charmaine Reilly)  (Signed 24-Jan-2025 12:48)  	Authored: History and Physical, History of Present Illness, Allergies/Medications, Patient History, Risk Assessment, Physical Exam, Assessment and Plan      Last Updated: 24-Jan-2025 12:48 by Charmaine Reilly)

## 2025-01-24 NOTE — PRE-ANESTHESIA EVALUATION ADULT - LAST ECHOCARDIOGRAM
1/16/25.  report reviewed.  mild basal septal hypertrophy.  NL LV function.  EF 60-65%.  NL RV.  mildly dilated LA. trace TR

## 2025-01-24 NOTE — BRIEF OPERATIVE NOTE - NSICDXBRIEFPREOP_GEN_ALL_CORE_FT
PRE-OP DIAGNOSIS:  Lumbar spinal stenosis 24-Jan-2025 13:02:55  Charmaine Reilly  Spondylolisthesis, lumbar region 24-Jan-2025 13:03:09  Charmaine Reilly

## 2025-01-24 NOTE — BRIEF OPERATIVE NOTE - NSICDXBRIEFPROCEDURE_GEN_ALL_CORE_FT
PROCEDURES:  Lumbar laminectomy 24-Jan-2025 13:02:29  Charmaine Reilly  Fusion of posterior lumbar spine 24-Jan-2025 13:02:46  Charmaine Reilly

## 2025-01-24 NOTE — BRIEF OPERATIVE NOTE - NSICDXBRIEFPOSTOP_GEN_ALL_CORE_FT
POST-OP DIAGNOSIS:  Lumbar spinal stenosis 24-Jan-2025 13:03:23  Charmaine Reilly  Spondylolisthesis of lumbar region 24-Jan-2025 13:03:38  Charmaine Reilly

## 2025-01-25 LAB
A1C WITH ESTIMATED AVERAGE GLUCOSE RESULT: 6 % — HIGH (ref 4–5.6)
ANION GAP SERPL CALC-SCNC: 11 MMOL/L — SIGNIFICANT CHANGE UP (ref 5–17)
BUN SERPL-MCNC: 26 MG/DL — HIGH (ref 7–23)
CALCIUM SERPL-MCNC: 7 MG/DL — LOW (ref 8.4–10.5)
CHLORIDE SERPL-SCNC: 111 MMOL/L — HIGH (ref 96–108)
CO2 SERPL-SCNC: 19 MMOL/L — LOW (ref 22–31)
CREAT SERPL-MCNC: 1.02 MG/DL — SIGNIFICANT CHANGE UP (ref 0.5–1.3)
EGFR: 56 ML/MIN/1.73M2 — LOW
ESTIMATED AVERAGE GLUCOSE: 126 MG/DL — HIGH (ref 68–114)
GLUCOSE SERPL-MCNC: 148 MG/DL — HIGH (ref 70–99)
HCT VFR BLD CALC: 23.2 % — LOW (ref 34.5–45)
HCT VFR BLD CALC: 25.4 % — LOW (ref 34.5–45)
HCT VFR BLD CALC: 25.9 % — LOW (ref 34.5–45)
HGB BLD-MCNC: 7.4 G/DL — LOW (ref 11.5–15.5)
HGB BLD-MCNC: 7.7 G/DL — LOW (ref 11.5–15.5)
HGB BLD-MCNC: 8.2 G/DL — LOW (ref 11.5–15.5)
MAGNESIUM SERPL-MCNC: 1.7 MG/DL — SIGNIFICANT CHANGE UP (ref 1.6–2.6)
MCHC RBC-ENTMCNC: 25.3 PG — LOW (ref 27–34)
MCHC RBC-ENTMCNC: 26.2 PG — LOW (ref 27–34)
MCHC RBC-ENTMCNC: 26.8 PG — LOW (ref 27–34)
MCHC RBC-ENTMCNC: 30.3 G/DL — LOW (ref 32–36)
MCHC RBC-ENTMCNC: 31.7 G/DL — LOW (ref 32–36)
MCHC RBC-ENTMCNC: 31.9 G/DL — LOW (ref 32–36)
MCV RBC AUTO: 82.7 FL — SIGNIFICANT CHANGE UP (ref 80–100)
MCV RBC AUTO: 83.6 FL — SIGNIFICANT CHANGE UP (ref 80–100)
MCV RBC AUTO: 84.1 FL — SIGNIFICANT CHANGE UP (ref 80–100)
NRBC # BLD: 0 /100 WBCS — SIGNIFICANT CHANGE UP (ref 0–0)
NRBC BLD-RTO: 0 /100 WBCS — SIGNIFICANT CHANGE UP (ref 0–0)
PHOSPHATE SERPL-MCNC: 4.3 MG/DL — SIGNIFICANT CHANGE UP (ref 2.5–4.5)
PLATELET # BLD AUTO: 151 K/UL — SIGNIFICANT CHANGE UP (ref 150–400)
PLATELET # BLD AUTO: 173 K/UL — SIGNIFICANT CHANGE UP (ref 150–400)
PLATELET # BLD AUTO: 176 K/UL — SIGNIFICANT CHANGE UP (ref 150–400)
POTASSIUM SERPL-MCNC: 3.8 MMOL/L — SIGNIFICANT CHANGE UP (ref 3.5–5.3)
POTASSIUM SERPL-SCNC: 3.8 MMOL/L — SIGNIFICANT CHANGE UP (ref 3.5–5.3)
RBC # BLD: 2.76 M/UL — LOW (ref 3.8–5.2)
RBC # BLD: 3.04 M/UL — LOW (ref 3.8–5.2)
RBC # BLD: 3.13 M/UL — LOW (ref 3.8–5.2)
RBC # FLD: 14.2 % — SIGNIFICANT CHANGE UP (ref 10.3–14.5)
RBC # FLD: 14.3 % — SIGNIFICANT CHANGE UP (ref 10.3–14.5)
RBC # FLD: 14.4 % — SIGNIFICANT CHANGE UP (ref 10.3–14.5)
SODIUM SERPL-SCNC: 141 MMOL/L — SIGNIFICANT CHANGE UP (ref 135–145)
WBC # BLD: 10.5 K/UL — SIGNIFICANT CHANGE UP (ref 3.8–10.5)
WBC # BLD: 5.92 K/UL — SIGNIFICANT CHANGE UP (ref 3.8–10.5)
WBC # BLD: 7.61 K/UL — SIGNIFICANT CHANGE UP (ref 3.8–10.5)
WBC # FLD AUTO: 10.5 K/UL — SIGNIFICANT CHANGE UP (ref 3.8–10.5)
WBC # FLD AUTO: 5.92 K/UL — SIGNIFICANT CHANGE UP (ref 3.8–10.5)
WBC # FLD AUTO: 7.61 K/UL — SIGNIFICANT CHANGE UP (ref 3.8–10.5)

## 2025-01-25 PROCEDURE — 99233 SBSQ HOSP IP/OBS HIGH 50: CPT

## 2025-01-25 PROCEDURE — 99024 POSTOP FOLLOW-UP VISIT: CPT

## 2025-01-25 RX ORDER — SENNOSIDES 8.6 MG
2 TABLET ORAL AT BEDTIME
Refills: 0 | Status: DISCONTINUED | OUTPATIENT
Start: 2025-01-25 | End: 2025-01-28

## 2025-01-25 RX ORDER — LOSARTAN POTASSIUM 100 MG
100 TABLET ORAL DAILY
Refills: 0 | Status: DISCONTINUED | OUTPATIENT
Start: 2025-01-25 | End: 2025-01-25

## 2025-01-25 RX ORDER — BACTERIOSTATIC SODIUM CHLORIDE 0.9 %
500 VIAL (ML) INJECTION ONCE
Refills: 0 | Status: COMPLETED | OUTPATIENT
Start: 2025-01-25 | End: 2025-01-25

## 2025-01-25 RX ORDER — POLYETHYLENE GLYCOL 3350 17 G/17G
17 POWDER, FOR SOLUTION ORAL DAILY
Refills: 0 | Status: DISCONTINUED | OUTPATIENT
Start: 2025-01-25 | End: 2025-01-27

## 2025-01-25 RX ORDER — LABETALOL HYDROCHLORIDE 300 MG/1
100 TABLET, FILM COATED ORAL
Refills: 0 | Status: DISCONTINUED | OUTPATIENT
Start: 2025-01-25 | End: 2025-01-26

## 2025-01-25 RX ORDER — AMLODIPINE BESYLATE 5 MG
10 TABLET ORAL DAILY
Refills: 0 | Status: DISCONTINUED | OUTPATIENT
Start: 2025-01-25 | End: 2025-01-25

## 2025-01-25 RX ADMIN — PREGABALIN CAPSULES, CV 50 MILLIGRAM(S): 225 CAPSULE ORAL at 21:22

## 2025-01-25 RX ADMIN — ACETAMINOPHEN 1000 MILLIGRAM(S): 160 SUSPENSION ORAL at 21:22

## 2025-01-25 RX ADMIN — PREGABALIN CAPSULES, CV 50 MILLIGRAM(S): 225 CAPSULE ORAL at 08:04

## 2025-01-25 RX ADMIN — ACETAMINOPHEN 1000 MILLIGRAM(S): 160 SUSPENSION ORAL at 09:02

## 2025-01-25 RX ADMIN — PANTOPRAZOLE 40 MILLIGRAM(S): 20 TABLET, DELAYED RELEASE ORAL at 08:04

## 2025-01-25 RX ADMIN — Medication 2: at 08:03

## 2025-01-25 RX ADMIN — Medication 500 MILLIGRAM(S): at 21:22

## 2025-01-25 RX ADMIN — Medication 500 MILLIGRAM(S): at 15:06

## 2025-01-25 RX ADMIN — ACETAMINOPHEN 1000 MILLIGRAM(S): 160 SUSPENSION ORAL at 08:02

## 2025-01-25 RX ADMIN — Medication 1000 MILLILITER(S): at 21:23

## 2025-01-25 RX ADMIN — Medication 75 MILLILITER(S): at 12:26

## 2025-01-25 RX ADMIN — Medication 2: at 17:48

## 2025-01-25 RX ADMIN — Medication 1000 MILLILITER(S): at 02:10

## 2025-01-25 RX ADMIN — Medication 100 MILLIGRAM(S): at 08:09

## 2025-01-25 RX ADMIN — ONDANSETRON 4 MILLIGRAM(S): 4 TABLET, ORALLY DISINTEGRATING ORAL at 08:06

## 2025-01-25 RX ADMIN — Medication 2: at 12:26

## 2025-01-25 RX ADMIN — Medication 500 MILLIGRAM(S): at 08:04

## 2025-01-25 RX ADMIN — ACETAMINOPHEN 1000 MILLIGRAM(S): 160 SUSPENSION ORAL at 15:06

## 2025-01-25 RX ADMIN — Medication 100 MILLIGRAM(S): at 07:32

## 2025-01-25 RX ADMIN — PREGABALIN CAPSULES, CV 50 MILLIGRAM(S): 225 CAPSULE ORAL at 15:06

## 2025-01-25 NOTE — CONSULT NOTE ADULT - ASSESSMENT
79F hx HLD, CAD, DM, HTN, low back pain with neurogenic claudication and RLE radiculopathy. The pt had imaging that showed progressive L5-S1 spondy with severe stenosis L2-S1. Failed conservative management Pt is now s/p L3-4 lami, L5-S1 TLIF.     #Lower back pain with neurogenic claudication and RLE radiculopathy  #progressive L5-S1 spondy with severe stenosis L2-S1  # s/p L3-4 lami, L5-S1 TLIF  -Continue pain and bowel regimen as well as drain management as per NSGY     #HTN/HLD  #CAD   -Home medications: Labetalol 100mg BID, HCTZ 25mg daily, amlodipine 10mg daily and telmisartan 80mg daily   -Pt with low BP this morning with SBP in the low 100's but asymptomatic   -Will continue labetalol 100mg BID, amlodipine 10mg daily and Losartan 100mg daily with holding parameters    #Diabetes   -Home medication: metformin 500mg BID   -HgbA1c 6   -Will continue to monitor FS and ISS     #Acute blood anemia   -Likely 2/2 post op loss   -Will continue to monitor Hgb and check iron studies   -Will continue to monitor Hgb with goal of Hgb > 8 given CAD hx     #DVT prop  -As per primary team   -Will continue venodyne     #DISPO  -Pt was seen by PT and recommended for RACHAEL    76 minutes spent on total encounter. The necessity of the time spent during the encounter on this date of service was due to:    Review of hospital course, labs, vitals, radiology and medical records.  Direct patient encounter including bedside exam and interview.   Discussed plan of care with primary team.    Documenting the encounter.   79F hx HLD, CAD, DM, HTN, low back pain with neurogenic claudication and RLE radiculopathy. The pt had imaging that showed progressive L5-S1 spondy with severe stenosis L2-S1. Failed conservative management Pt is now s/p L3-4 lami, L5-S1 TLIF.     #Lower back pain with neurogenic claudication and RLE radiculopathy  #progressive L5-S1 spondy with severe stenosis L2-S1  # s/p L3-4 lami, L5-S1 TLIF  -Continue pain and bowel regimen as well as drain management as per NSGY     #HTN/HLD  #CAD   -Home medications: Labetalol 100mg BID, HCTZ 25mg daily, amlodipine 10mg daily and telmisartan 80mg daily   -Pt with low BP this morning with SBP in the low 100's but asymptomatic   -Will continue labetalol 100mg BID, amlodipine 10mg daily and Losartan 100mg daily with holding parameters    #Diabetes   -Home medication: metformin 500mg BID   -HgbA1c 6   -Will continue to monitor FS and ISS     #Acute blood anemia   -Likely 2/2 post op loss   -Will continue to monitor serial CBCs and check iron studies   -Will continue to monitor Hgb with goal of Hgb > 8 given CAD hx     #Gastric ulcer  -Continue PPI     #DVT prop  -As per primary team   -Will continue venodyne     #DISPO  -Pt was seen by PT and recommended for RACHEAL    76 minutes spent on total encounter. The necessity of the time spent during the encounter on this date of service was due to:    Review of hospital course, labs, vitals, radiology and medical records.  Direct patient encounter including bedside exam and interview.   Discussed plan of care with primary team.    Documenting the encounter.

## 2025-01-25 NOTE — OCCUPATIONAL THERAPY INITIAL EVALUATION ADULT - PATIENT/FAMILY/SIGNIFICANT OTHER GOALS STATEMENT, OT EVAL
Critical Care Critical Care Critical Care Hospitalist Nephrology Nephrology Hospitalist Hospitalist Nephrology Nephrology Nephrology Nephrology Critical Care Critical Care Hospitalist Nephrology to get stronger

## 2025-01-25 NOTE — OCCUPATIONAL THERAPY INITIAL EVALUATION ADULT - ADDITIONAL COMMENTS
Pt r handed and wears glasses. Pt lives alone in private home with 1 ALLEN + 1 FOS within. At baseline pt indep with ADL and mobility. Pt owns cane, high toilet, grab bars in shower.

## 2025-01-25 NOTE — OCCUPATIONAL THERAPY INITIAL EVALUATION ADULT - GENERAL OBSERVATIONS, REHAB EVAL
RN Gay aware of intent to eval. PT Ray present throughout. Pt received semisupine +NAD +hmv x2 +IV +2L O2 via NC +tele; pt left as found with all lines intact and needs met, MD present at bedside and RN aware of RN Gay aware of intent to eval. PT Ray present throughout. Pt received semisupine +NAD +hmv x2 +IV +2L O2 via NC +tele; pt left as found with all lines intact and needs met, MD present at bedside and RN aware

## 2025-01-25 NOTE — PHYSICAL THERAPY INITIAL EVALUATION ADULT - GENERAL OBSERVATIONS, REHAB EVAL
PT IE Completed, RN Gay aware of intent to treat, pt received in supine, +tele, +BP cuff, +IV, +HVx2

## 2025-01-25 NOTE — PROVIDER CONTACT NOTE (OTHER) - ASSESSMENT
Patient admit having history of mild sleep apnea  but denied buying the equipment as recommended Patient admit having history of mild sleep apnea  but denied buying the equipment as recommended. Latest BP= 107/53 mmhg

## 2025-01-25 NOTE — OCCUPATIONAL THERAPY INITIAL EVALUATION ADULT - PERTINENT HX OF CURRENT PROBLEM, REHAB EVAL
79F hx HLD, CAD, DM, HTN, low back pain with neurogenic claudication and RLE radiculopathy. Imaging with progressive L5-S1 spondy with severe stenosis L2-S1. Failed conservative management (extensive PT and multiple rounds of injections including ANNA, trigger points and RFA).  Now s/p L3-4 lami, L5-S1 TLIF.

## 2025-01-25 NOTE — PROVIDER CONTACT NOTE (OTHER) - BACKGROUND
C/o of nasal dryness hence humidifier applied an hour ago C/o of nasal dryness hence humidifier applied an hour ago. Had  ml x 1 IVF bolus as BP was 90s/50s mmhg.

## 2025-01-25 NOTE — PHYSICAL THERAPY INITIAL EVALUATION ADULT - ADDITIONAL COMMENTS
Prior to admission pt reports living alone in private home, 1 ALLEN and 1 flight inside that pt needs to negotiate on a daily basis(bedroom upstairs), indep with ADLs and mobility, no use of AD. Pt has the following AD; cane, high toilet seat, walk-in shower

## 2025-01-25 NOTE — PROGRESS NOTE ADULT - SUBJECTIVE AND OBJECTIVE BOX
HPI:  79F hx HLD, CAD, DM, HTN, low back pain with neurogenic claudication and RLE radiculopathy. Imaging with progressive L5-S1 spondy with severe stenosis L2-S1. Failed conservative management (extensive PT and multiple rounds of injections including ANNA, trigger points and RFA). (24 Jan 2025 12:39)    OVERNIGHT EVENTS: Seen and examined in _____. Denies HA, N/V, chest pain, shortness of breath, new weakness or numbness.     HOSPITAL COURSE:    Vital Signs Last 24 Hrs  T(C): 36.8 (25 Jan 2025 00:27), Max: 36.8 (25 Jan 2025 00:27)  T(F): 98.2 (25 Jan 2025 00:27), Max: 98.2 (25 Jan 2025 00:27)  HR: 61 (25 Jan 2025 03:41) (59 - 84)  BP: 107/53 (25 Jan 2025 03:41) (93/50 - 152/76)  BP(mean): 68 (24 Jan 2025 21:14) (68 - 79)  RR: 14 (25 Jan 2025 03:41) (12 - 20)  SpO2: 97% (25 Jan 2025 03:41) (89% - 99%)    Parameters below as of 25 Jan 2025 03:41  Patient On (Oxygen Delivery Method): nasal cannula  O2 Flow (L/min): 5      I&O's Summary    24 Jan 2025 07:01  -  25 Jan 2025 05:38  --------------------------------------------------------  IN: 1325 mL / OUT: 605 mL / NET: 720 mL        PHYSICAL EXAM:  General: NAD, pt is comfortably sitting up in bed, on room air  HEENT: PERRL 3mm briskly reactive, EOMI b/l, face symmetric, tongue midline, neck FROM  Cardiovascular: RRR, S1, S2  Respiratory: breathing non-labored on RA, chest rise symmetric  GI: abd soft, NTND   Neuro: A&Ox3, No aphasia, speech clear, no dysmetria, no pronator drift. Follows commands.  HAWKINS x4 spontaneously, 5/5 strength in all extremities throughout. Sensation intact  Extremities: distal pulses 2+ x4  Wound/incision:  Drain:     TUBES/LINES:  [] Mohamud  [] Wound Drains  [] Others      DIET:  [] NPO  [] Mechanical  [] Tube feeds    LABS:                        9.5    5.53  )-----------( 182      ( 24 Jan 2025 19:44 )             29.1     01-24    140  |  108  |  26[H]  ----------------------------<  185[H]  4.0   |  22  |  1.16    Ca    8.5      24 Jan 2025 19:44  Phos  4.1     01-24  Mg     1.8     01-24        Urinalysis Basic - ( 24 Jan 2025 19:44 )    Color: x / Appearance: x / SG: x / pH: x  Gluc: 185 mg/dL / Ketone: x  / Bili: x / Urobili: x   Blood: x / Protein: x / Nitrite: x   Leuk Esterase: x / RBC: x / WBC x   Sq Epi: x / Non Sq Epi: x / Bacteria: x          CAPILLARY BLOOD GLUCOSE      POCT Blood Glucose.: 191 mg/dL (24 Jan 2025 22:31)  POCT Blood Glucose.: 127 mg/dL (24 Jan 2025 08:56)      Drug Levels: [] N/A    CSF Analysis: [] N/A      Allergies    No Known Allergies    Intolerances      MEDICATIONS:  Antibiotics:  ceFAZolin   IVPB 2000 milliGRAM(s) IV Intermittent every 8 hours    Neuro:  acetaminophen     Tablet .. 1000 milliGRAM(s) Oral every 8 hours  HYDROmorphone  Injectable 0.5 milliGRAM(s) IV Push every 4 hours PRN  methocarbamol 500 milliGRAM(s) Oral every 8 hours  ondansetron   Disintegrating Tablet 4 milliGRAM(s) Oral every 6 hours  oxyCODONE    IR 5 milliGRAM(s) Oral every 4 hours PRN  pregabalin 50 milliGRAM(s) Oral three times a day    Anticoagulation:    OTHER:  amLODIPine   Tablet 10 milliGRAM(s) Oral daily  insulin lispro (ADMELOG) corrective regimen sliding scale   SubCutaneous three times a day before meals  labetalol 100 milliGRAM(s) Oral two times a day  losartan 100 milliGRAM(s) Oral daily  pantoprazole    Tablet 40 milliGRAM(s) Oral before breakfast  polyethylene glycol 3350 17 Gram(s) Oral daily  senna 2 Tablet(s) Oral at bedtime    IVF:  sodium chloride 0.9%. 1000 milliLiter(s) IV Continuous <Continuous>    CULTURES:    RADIOLOGY & ADDITIONAL TESTS:      ASSESSMENT:  79y Female s/p    M54.16    Handoff    MEWS Score    Back pain    ALIZE (obstructive sleep apnea)    Stage 3 chronic kidney disease    HLD (hyperlipidemia)    HTN (hypertension)    Breast CA    Cholelithiases    Gastric ulcer    DM (diabetes mellitus)    OA (osteoarthritis)    Incontinence    Lumbar spinal stenosis    Spondylolisthesis of lumbar region    Lumbar spinal stenosis    Spondylolisthesis, lumbar region    Lumbar laminectomy    Fusion of posterior lumbar spine    H/O lumpectomy    History of appendectomy    H/O right hemicolectomy    H/O hernia repair    H/O arthroscopy of knee    Abnormal magnetic resonance cholangiopancreatography (MRCP)    History of colonoscopy    History of esophagogastroduodenoscopy (EGD)    S/P thyroid biopsy    SysAdmin_VstLnk        PLAN:  NEURO:    CARDIOVASCULAR:    PULMONARY:    RENAL:    GI:    HEME:    ID:    ENDO:    DVT PROPHYLAXIS:  [] Venodynes                                [] Heparin/Lovenox    DISPOSITION:    Assessment:  Present when checked    []  GCS  E   V  M     Heart Failure: []Acute, [] acute on chronic , []chronic  Heart Failure:  [] Diastolic (HFpEF), [] Systolic (HFrEF), []Combined (HFpEF and HFrEF), [] RHF, [] Pulm HTN, [] Other    [] SEAN, [] ATN, [] AIN, [] other  [] CKD1, [] CKD2, [] CKD 3, [] CKD 4, [] CKD 5, []ESRD    Encephalopathy: [] Metabolic, [] Hepatic, [] toxic, [] Neurological, [] Other    Abnormal Nurtitional Status: [] malnurtition (see nutrition note), [ ]underweight: BMI < 19, [] morbid obesity: BMI >40, [] Cachexia    [] Sepsis  [] hypovolemic shock,[] cardiogenic shock, [] hemorrhagic shock, [] neuogenic shock  [] Acute Respiratory Failure  []Cerebral edema, [] Brain compression/ herniation,   [] Functional quadriplegia  [] Acute blood loss anemia   HPI:  79F hx HLD, CAD, DM, HTN, low back pain with neurogenic claudication and RLE radiculopathy. Imaging with progressive L5-S1 spondy with severe stenosis L2-S1. Failed conservative management (extensive PT and multiple rounds of injections including ANNA, trigger points and RFA). (24 Jan 2025 12:39)    OVERNIGHT EVENTS: Seen and examined in 9WO. Denies HA, N/V, chest pain, shortness of breath, new weakness or numbness.     HOSPITAL COURSE:  1/24: POD 0 from L3-4 lami, L5-S1 TLIF. 500cc bolus for SBP 90s.   1/25. POD 1. JUSTIN overnight.     Vital Signs Last 24 Hrs  T(C): 36.8 (25 Jan 2025 00:27), Max: 36.8 (25 Jan 2025 00:27)  T(F): 98.2 (25 Jan 2025 00:27), Max: 98.2 (25 Jan 2025 00:27)  HR: 61 (25 Jan 2025 03:41) (59 - 84)  BP: 107/53 (25 Jan 2025 03:41) (93/50 - 152/76)  BP(mean): 68 (24 Jan 2025 21:14) (68 - 79)  RR: 14 (25 Jan 2025 03:41) (12 - 20)  SpO2: 97% (25 Jan 2025 03:41) (89% - 99%)    Parameters below as of 25 Jan 2025 03:41  Patient On (Oxygen Delivery Method): nasal cannula  O2 Flow (L/min): 5      I&O's Summary    24 Jan 2025 07:01  -  25 Jan 2025 05:38  --------------------------------------------------------  IN: 1325 mL / OUT: 605 mL / NET: 720 mL        PHYSICAL EXAM:  General: NAD, pt is comfortably sitting up in bed, on NC   HEENT: PERRL 3mm briskly reactive, EOMI b/l, face symmetric, tongue midline, neck FROM  Cardiovascular: RRR, S1, S2  Respiratory: non-labored breathing on NC, chest rise symmetric  GI: abd soft, NTND   Neuro: A&Ox3, No aphasia, speech clear, no dysmetria, no pronator drift. Follows commands.  HAWKINS x4 spontaneously, 5/5 strength in all extremities throughout. Sensation intact  Extremities: distal pulses 2+ x4  Wound/incision: lumbar incision dressing c/d/i   Drain: HMV x2, dressings c/d/i     TUBES/LINES:  [] Rice  [x] Wound Drains  [] Others      DIET:  [] NPO  [x] Mechanical- ADAT  [] Tube feeds    LABS:                        9.5    5.53  )-----------( 182      ( 24 Jan 2025 19:44 )             29.1     01-24    140  |  108  |  26[H]  ----------------------------<  185[H]  4.0   |  22  |  1.16    Ca    8.5      24 Jan 2025 19:44  Phos  4.1     01-24  Mg     1.8     01-24        Urinalysis Basic - ( 24 Jan 2025 19:44 )    Color: x / Appearance: x / SG: x / pH: x  Gluc: 185 mg/dL / Ketone: x  / Bili: x / Urobili: x   Blood: x / Protein: x / Nitrite: x   Leuk Esterase: x / RBC: x / WBC x   Sq Epi: x / Non Sq Epi: x / Bacteria: x          CAPILLARY BLOOD GLUCOSE      POCT Blood Glucose.: 191 mg/dL (24 Jan 2025 22:31)  POCT Blood Glucose.: 127 mg/dL (24 Jan 2025 08:56)      Drug Levels: [] N/A    CSF Analysis: [] N/A      Allergies    No Known Allergies    Intolerances      MEDICATIONS:  Antibiotics:  ceFAZolin   IVPB 2000 milliGRAM(s) IV Intermittent every 8 hours    Neuro:  acetaminophen     Tablet .. 1000 milliGRAM(s) Oral every 8 hours  HYDROmorphone  Injectable 0.5 milliGRAM(s) IV Push every 4 hours PRN  methocarbamol 500 milliGRAM(s) Oral every 8 hours  ondansetron   Disintegrating Tablet 4 milliGRAM(s) Oral every 6 hours  oxyCODONE    IR 5 milliGRAM(s) Oral every 4 hours PRN  pregabalin 50 milliGRAM(s) Oral three times a day    Anticoagulation:    OTHER:  amLODIPine   Tablet 10 milliGRAM(s) Oral daily  insulin lispro (ADMELOG) corrective regimen sliding scale   SubCutaneous three times a day before meals  labetalol 100 milliGRAM(s) Oral two times a day  losartan 100 milliGRAM(s) Oral daily  pantoprazole    Tablet 40 milliGRAM(s) Oral before breakfast  polyethylene glycol 3350 17 Gram(s) Oral daily  senna 2 Tablet(s) Oral at bedtime    IVF:  sodium chloride 0.9%. 1000 milliLiter(s) IV Continuous <Continuous>    CULTURES:    RADIOLOGY & ADDITIONAL TESTS:  pending post op x-rays    ASSESSMENT:  79F hx HLD, CAD, DM, HTN, low back pain with neurogenic claudication and RLE radiculopathy. Imaging with progressive L5-S1 spondy with severe stenosis L2-S1. Failed conservative management (extensive PT and multiple rounds of injections including ANNA, trigger points and RFA).  Now s/p L3-4 lami, L5-S1 TLIF.       M54.16    Handoff    MEWS Score    Back pain    ALIZE (obstructive sleep apnea)    Stage 3 chronic kidney disease    HLD (hyperlipidemia)    HTN (hypertension)    Breast CA    Cholelithiases    Gastric ulcer    DM (diabetes mellitus)    OA (osteoarthritis)    Incontinence    Lumbar spinal stenosis    Spondylolisthesis of lumbar region    Lumbar spinal stenosis    Spondylolisthesis, lumbar region    Lumbar laminectomy    Fusion of posterior lumbar spine    H/O lumpectomy    History of appendectomy    H/O right hemicolectomy    H/O hernia repair    H/O arthroscopy of knee    Abnormal magnetic resonance cholangiopancreatography (MRCP)    History of colonoscopy    History of esophagogastroduodenoscopy (EGD)    S/P thyroid biopsy    SysAdmin_VstLnk        PLAN:  NEURO:  - neuro/vitals q4  - pain control w ERAS   - pending standing x-rays   - HMV x2, monitor output    CARDS:  - -160   - hx HTN: c/w home labetalol, c/w amlodipine, c/w home losartan    PULM:  - NC prn, encourage IS   - hx ALIZE    GI:  - ADAT to CCD  - bowel reg   - c/w home protonix    RENAL:  - IVF until adequate PO intake   - (+) rice      ENDO:  - ISS  - hx of DM: hold home metformin     HEME:  - DVT ppx w SCDs, holding SQL post op     ID:  - afebrile  - post op ancef     DISPO: telemetry, pending PT/OT     D/w Dr. Goodwin  Assessment:  Present when checked    []  GCS  E   V  M     Heart Failure: []Acute, [] acute on chronic , []chronic  Heart Failure:  [] Diastolic (HFpEF), [] Systolic (HFrEF), []Combined (HFpEF and HFrEF), [] RHF, [] Pulm HTN, [] Other    [] SEAN, [] ATN, [] AIN, [] other  [] CKD1, [] CKD2, [] CKD 3, [] CKD 4, [] CKD 5, []ESRD    Encephalopathy: [] Metabolic, [] Hepatic, [] toxic, [] Neurological, [] Other    Abnormal Nurtitional Status: [] malnurtition (see nutrition note), [ ]underweight: BMI < 19, [] morbid obesity: BMI >40, [] Cachexia    [] Sepsis  [] hypovolemic shock,[] cardiogenic shock, [] hemorrhagic shock, [] neuogenic shock  [] Acute Respiratory Failure  []Cerebral edema, [] Brain compression/ herniation,   [] Functional quadriplegia  [] Acute blood loss anemia

## 2025-01-26 LAB
ANION GAP SERPL CALC-SCNC: 10 MMOL/L — SIGNIFICANT CHANGE UP (ref 5–17)
ANION GAP SERPL CALC-SCNC: 11 MMOL/L — SIGNIFICANT CHANGE UP (ref 5–17)
BLD GP AB SCN SERPL QL: NEGATIVE — SIGNIFICANT CHANGE UP
BUN SERPL-MCNC: 39 MG/DL — HIGH (ref 7–23)
BUN SERPL-MCNC: 43 MG/DL — HIGH (ref 7–23)
CALCIUM SERPL-MCNC: 8 MG/DL — LOW (ref 8.4–10.5)
CALCIUM SERPL-MCNC: 8 MG/DL — LOW (ref 8.4–10.5)
CHLORIDE SERPL-SCNC: 105 MMOL/L — SIGNIFICANT CHANGE UP (ref 96–108)
CHLORIDE SERPL-SCNC: 105 MMOL/L — SIGNIFICANT CHANGE UP (ref 96–108)
CO2 SERPL-SCNC: 20 MMOL/L — LOW (ref 22–31)
CO2 SERPL-SCNC: 22 MMOL/L — SIGNIFICANT CHANGE UP (ref 22–31)
CREAT SERPL-MCNC: 1.37 MG/DL — HIGH (ref 0.5–1.3)
CREAT SERPL-MCNC: 1.6 MG/DL — HIGH (ref 0.5–1.3)
EGFR: 33 ML/MIN/1.73M2 — LOW
EGFR: 40 ML/MIN/1.73M2 — LOW
GLUCOSE SERPL-MCNC: 118 MG/DL — HIGH (ref 70–99)
GLUCOSE SERPL-MCNC: 129 MG/DL — HIGH (ref 70–99)
HCT VFR BLD CALC: 24.9 % — LOW (ref 34.5–45)
HCT VFR BLD CALC: 27.1 % — LOW (ref 34.5–45)
HGB BLD-MCNC: 7.4 G/DL — LOW (ref 11.5–15.5)
HGB BLD-MCNC: 8.1 G/DL — LOW (ref 11.5–15.5)
MAGNESIUM SERPL-MCNC: 2.1 MG/DL — SIGNIFICANT CHANGE UP (ref 1.6–2.6)
MCHC RBC-ENTMCNC: 25.1 PG — LOW (ref 27–34)
MCHC RBC-ENTMCNC: 25.3 PG — LOW (ref 27–34)
MCHC RBC-ENTMCNC: 29.7 G/DL — LOW (ref 32–36)
MCHC RBC-ENTMCNC: 29.9 G/DL — LOW (ref 32–36)
MCV RBC AUTO: 84.4 FL — SIGNIFICANT CHANGE UP (ref 80–100)
MCV RBC AUTO: 84.7 FL — SIGNIFICANT CHANGE UP (ref 80–100)
NRBC # BLD: 0 /100 WBCS — SIGNIFICANT CHANGE UP (ref 0–0)
NRBC # BLD: 0 /100 WBCS — SIGNIFICANT CHANGE UP (ref 0–0)
NRBC BLD-RTO: 0 /100 WBCS — SIGNIFICANT CHANGE UP (ref 0–0)
NRBC BLD-RTO: 0 /100 WBCS — SIGNIFICANT CHANGE UP (ref 0–0)
PHOSPHATE SERPL-MCNC: 3.5 MG/DL — SIGNIFICANT CHANGE UP (ref 2.5–4.5)
PLATELET # BLD AUTO: 147 K/UL — LOW (ref 150–400)
PLATELET # BLD AUTO: 162 K/UL — SIGNIFICANT CHANGE UP (ref 150–400)
POTASSIUM SERPL-MCNC: 4.2 MMOL/L — SIGNIFICANT CHANGE UP (ref 3.5–5.3)
POTASSIUM SERPL-MCNC: 4.5 MMOL/L — SIGNIFICANT CHANGE UP (ref 3.5–5.3)
POTASSIUM SERPL-SCNC: 4.2 MMOL/L — SIGNIFICANT CHANGE UP (ref 3.5–5.3)
POTASSIUM SERPL-SCNC: 4.5 MMOL/L — SIGNIFICANT CHANGE UP (ref 3.5–5.3)
RBC # BLD: 2.95 M/UL — LOW (ref 3.8–5.2)
RBC # BLD: 3.2 M/UL — LOW (ref 3.8–5.2)
RBC # FLD: 14.6 % — HIGH (ref 10.3–14.5)
RBC # FLD: 14.6 % — HIGH (ref 10.3–14.5)
RH IG SCN BLD-IMP: POSITIVE — SIGNIFICANT CHANGE UP
SODIUM SERPL-SCNC: 136 MMOL/L — SIGNIFICANT CHANGE UP (ref 135–145)
SODIUM SERPL-SCNC: 137 MMOL/L — SIGNIFICANT CHANGE UP (ref 135–145)
WBC # BLD: 7.62 K/UL — SIGNIFICANT CHANGE UP (ref 3.8–10.5)
WBC # BLD: 8.61 K/UL — SIGNIFICANT CHANGE UP (ref 3.8–10.5)
WBC # FLD AUTO: 7.62 K/UL — SIGNIFICANT CHANGE UP (ref 3.8–10.5)
WBC # FLD AUTO: 8.61 K/UL — SIGNIFICANT CHANGE UP (ref 3.8–10.5)

## 2025-01-26 PROCEDURE — 99233 SBSQ HOSP IP/OBS HIGH 50: CPT

## 2025-01-26 PROCEDURE — 99024 POSTOP FOLLOW-UP VISIT: CPT

## 2025-01-26 RX ORDER — ENOXAPARIN SODIUM 100 MG/ML
40 INJECTION SUBCUTANEOUS
Refills: 0 | Status: DISCONTINUED | OUTPATIENT
Start: 2025-01-26 | End: 2025-01-26

## 2025-01-26 RX ORDER — LABETALOL HYDROCHLORIDE 300 MG/1
75 TABLET, FILM COATED ORAL EVERY 12 HOURS
Refills: 0 | Status: DISCONTINUED | OUTPATIENT
Start: 2025-01-26 | End: 2025-01-30

## 2025-01-26 RX ORDER — BACTERIOSTATIC SODIUM CHLORIDE 0.9 %
1000 VIAL (ML) INJECTION ONCE
Refills: 0 | Status: COMPLETED | OUTPATIENT
Start: 2025-01-26 | End: 2025-01-26

## 2025-01-26 RX ORDER — ENOXAPARIN SODIUM 100 MG/ML
40 INJECTION SUBCUTANEOUS EVERY 24 HOURS
Refills: 0 | Status: DISCONTINUED | OUTPATIENT
Start: 2025-01-26 | End: 2025-01-30

## 2025-01-26 RX ADMIN — Medication 500 MILLIGRAM(S): at 06:58

## 2025-01-26 RX ADMIN — ENOXAPARIN SODIUM 40 MILLIGRAM(S): 100 INJECTION SUBCUTANEOUS at 21:05

## 2025-01-26 RX ADMIN — Medication 500 MILLIGRAM(S): at 21:05

## 2025-01-26 RX ADMIN — PREGABALIN CAPSULES, CV 50 MILLIGRAM(S): 225 CAPSULE ORAL at 06:59

## 2025-01-26 RX ADMIN — Medication 2 TABLET(S): at 21:05

## 2025-01-26 RX ADMIN — POLYETHYLENE GLYCOL 3350 17 GRAM(S): 17 POWDER, FOR SOLUTION ORAL at 18:37

## 2025-01-26 RX ADMIN — Medication 4: at 12:10

## 2025-01-26 RX ADMIN — ACETAMINOPHEN 1000 MILLIGRAM(S): 160 SUSPENSION ORAL at 02:59

## 2025-01-26 RX ADMIN — ACETAMINOPHEN 1000 MILLIGRAM(S): 160 SUSPENSION ORAL at 14:20

## 2025-01-26 RX ADMIN — PREGABALIN CAPSULES, CV 50 MILLIGRAM(S): 225 CAPSULE ORAL at 21:05

## 2025-01-26 RX ADMIN — Medication 500 MILLIGRAM(S): at 14:20

## 2025-01-26 RX ADMIN — Medication 1000 MILLILITER(S): at 09:07

## 2025-01-26 RX ADMIN — LABETALOL HYDROCHLORIDE 100 MILLIGRAM(S): 300 TABLET, FILM COATED ORAL at 06:59

## 2025-01-26 RX ADMIN — PANTOPRAZOLE 40 MILLIGRAM(S): 20 TABLET, DELAYED RELEASE ORAL at 06:58

## 2025-01-26 RX ADMIN — ACETAMINOPHEN 1000 MILLIGRAM(S): 160 SUSPENSION ORAL at 21:06

## 2025-01-26 RX ADMIN — PREGABALIN CAPSULES, CV 50 MILLIGRAM(S): 225 CAPSULE ORAL at 14:20

## 2025-01-26 NOTE — PROVIDER CONTACT NOTE (OTHER) - SITUATION
NSG notified: pt HR 49 when sleeping, drain output L100, R60 over last 4 hours, systolics in 90s
During round, pt was desatting to as low as 89-90% even with humidified O2 2LNC. O2 rate adjusted to between 4-5 l NC with O2 sat improving to as high as 97% but when she fell asleep, O2 sat 93%
Patient was observed sleeping with desaturation to as low as 86% despite O2 4L NC on.

## 2025-01-26 NOTE — PROGRESS NOTE ADULT - SUBJECTIVE AND OBJECTIVE BOX
Patient was seen and examined at bedside. Case discuss with the primary team. Pt w/o any events overnight. Spoke to the pt about her medical hx and per pt she did not know of the hx of CAD. In addition I spoke to the pt about need for blood transfusion however the pt stated that she would prefer not to be transfused however if it is needed then she will take it as she wants to get better. However pt stated that she prefers to wait for repeat CBC this afternoon. Pt received labetalol this morning given BP of 115/52 and her repeat BP was 86/46. Pt's BP improved to 108/50 following 1L IVF.     OBJECTIVE:  Vital Signs Last 24 Hrs  T(C): 36.5 (26 Jan 2025 09:08), Max: 36.8 (26 Jan 2025 00:33)  T(F): 97.7 (26 Jan 2025 09:08), Max: 98.3 (26 Jan 2025 00:33)  HR: 72 (26 Jan 2025 09:08) (70 - 74)  BP: 108/50 (26 Jan 2025 09:34) (86/46 - 115/52)  BP(mean): 79 (26 Jan 2025 06:45) (79 - 80)  RR: 17 (26 Jan 2025 09:08) (17 - 18)  SpO2: 93% (26 Jan 2025 09:08) (92% - 97%)    Parameters below as of 26 Jan 2025 09:08  Patient On (Oxygen Delivery Method): room air      PHYSICAL EXAM:  Gen: NAD sitting up in bed  CV: RRR, +S1/S2, no mumur  Pulm: CTA b/l no wheezing or crackles   Abd: soft, NTND + BS no rebound or guarding + HMV drain in place   Neuro: AAOX3; nonfocal       LABS:                        7.4    7.62  )-----------( 147      ( 26 Jan 2025 05:30 )             24.9     01-26    137  |  105  |  43[H]  ----------------------------<  129[H]  4.2   |  22  |  1.60[H]    Ca    8.0[L]      26 Jan 2025 05:30  Phos  3.5     01-26  Mg     2.1     01-26          CAPILLARY BLOOD GLUCOSE      POCT Blood Glucose.: 202 mg/dL (26 Jan 2025 11:52)  POCT Blood Glucose.: 147 mg/dL (26 Jan 2025 07:55)  POCT Blood Glucose.: 222 mg/dL (25 Jan 2025 22:18)  POCT Blood Glucose.: 187 mg/dL (25 Jan 2025 16:50)    Urinalysis Basic - ( 26 Jan 2025 05:30 )    Color: x / Appearance: x / SG: x / pH: x  Gluc: 129 mg/dL / Ketone: x  / Bili: x / Urobili: x   Blood: x / Protein: x / Nitrite: x   Leuk Esterase: x / RBC: x / WBC x   Sq Epi: x / Non Sq Epi: x / Bacteria: x        acetaminophen     Tablet .. 1000 milliGRAM(s) Oral every 8 hours  HYDROmorphone  Injectable 0.5 milliGRAM(s) IV Push every 4 hours PRN  insulin lispro (ADMELOG) corrective regimen sliding scale   SubCutaneous three times a day before meals  labetalol 100 milliGRAM(s) Oral two times a day  methocarbamol 500 milliGRAM(s) Oral every 8 hours  ondansetron   Disintegrating Tablet 4 milliGRAM(s) Oral every 6 hours  oxyCODONE    IR 5 milliGRAM(s) Oral every 4 hours PRN  pantoprazole    Tablet 40 milliGRAM(s) Oral before breakfast  polyethylene glycol 3350 17 Gram(s) Oral daily  pregabalin 50 milliGRAM(s) Oral three times a day  senna 2 Tablet(s) Oral at bedtime

## 2025-01-26 NOTE — PROGRESS NOTE ADULT - SUBJECTIVE AND OBJECTIVE BOX
HPI:  79F hx HLD, CAD, DM, HTN, low back pain with neurogenic claudication and RLE radiculopathy. Imaging with progressive L5-S1 spondy with severe stenosis L2-S1. Failed conservative management (extensive PT and multiple rounds of injections including ANNA, trigger points and RFA). (24 Jan 2025 12:39)      Subjective:  Pain controlled. No acute complaints.     Hospital course:  1/24: POD 0 from L3-4 lami, L5-S1 TLIF. 500cc bolus for SBP 90s.   1/25. POD 1. JUSTIN overnight. Worked w PT/OT, rec for AR. Amlodipine dc'd per hospitalist. Losartan held.  1/26: POD2, o/n 500cc bolus NS given for low urine production.       Vital Signs Last 24 Hrs  T(C): 36.8 (26 Jan 2025 00:33), Max: 36.8 (25 Jan 2025 11:33)  T(F): 98.3 (26 Jan 2025 00:33), Max: 98.3 (25 Jan 2025 11:33)  HR: 73 (26 Jan 2025 00:33) (58 - 83)  BP: 99/52 (26 Jan 2025 00:33) (88/46 - 113/79)  BP(mean): 80 (25 Jan 2025 20:30) (77 - 91)  RR: 18 (26 Jan 2025 00:33) (14 - 19)  SpO2: 92% (26 Jan 2025 00:33) (92% - 98%)    Parameters below as of 26 Jan 2025 00:33  Patient On (Oxygen Delivery Method): room air        I&O's Summary    24 Jan 2025 07:01  -  25 Jan 2025 07:00  --------------------------------------------------------  IN: 1875 mL / OUT: 940 mL / NET: 935 mL    25 Jan 2025 07:01  -  26 Jan 2025 01:58  --------------------------------------------------------  IN: 1650 mL / OUT: 867.5 mL / NET: 782.5 mL        PHYSICAL EXAM:    General: patient seen laying supine in bed in NAD  Neuro: AAOx3, follows commands, opens eyes spontaneously, speech clear and fluent,  face symmetric,  strength 5/5 b/l upper extremities and lower extremities, sensation intact to light touch throughout  HEENT: PERRL  Neck: supple  Cardiac: RRR, S1S2  Pulmonary: chest rise symmetric  Abdomen: soft, nontender, nondistended  Ext: perfusing well  Skin: warm, dry  Wound: posterior lumbar incision dressing c/d/i, HMV x 2    LABS:                        7.7    10.50 )-----------( 173      ( 25 Jan 2025 14:00 )             25.4     01-25    141  |  111[H]  |  26[H]  ----------------------------<  148[H]  3.8   |  19[L]  |  1.02    Ca    7.0[L]      25 Jan 2025 05:42  Phos  4.3     01-25  Mg     1.7     01-25        Urinalysis Basic - ( 25 Jan 2025 05:42 )    Color: x / Appearance: x / SG: x / pH: x  Gluc: 148 mg/dL / Ketone: x  / Bili: x / Urobili: x   Blood: x / Protein: x / Nitrite: x   Leuk Esterase: x / RBC: x / WBC x   Sq Epi: x / Non Sq Epi: x / Bacteria: x          CAPILLARY BLOOD GLUCOSE      POCT Blood Glucose.: 222 mg/dL (25 Jan 2025 22:18)  POCT Blood Glucose.: 187 mg/dL (25 Jan 2025 16:50)  POCT Blood Glucose.: 175 mg/dL (25 Jan 2025 11:49)  POCT Blood Glucose.: 157 mg/dL (25 Jan 2025 07:10)      Drug Levels: [] N/A    CSF Analysis: [] N/A      Allergies    No Known Allergies    Intolerances      MEDICATIONS:  Antibiotics:    Neuro:  acetaminophen     Tablet .. 1000 milliGRAM(s) Oral every 8 hours  HYDROmorphone  Injectable 0.5 milliGRAM(s) IV Push every 4 hours PRN  methocarbamol 500 milliGRAM(s) Oral every 8 hours  ondansetron   Disintegrating Tablet 4 milliGRAM(s) Oral every 6 hours  oxyCODONE    IR 5 milliGRAM(s) Oral every 4 hours PRN  pregabalin 50 milliGRAM(s) Oral three times a day    Anticoagulation:    OTHER:  insulin lispro (ADMELOG) corrective regimen sliding scale   SubCutaneous three times a day before meals  labetalol 100 milliGRAM(s) Oral two times a day  pantoprazole    Tablet 40 milliGRAM(s) Oral before breakfast  polyethylene glycol 3350 17 Gram(s) Oral daily  senna 2 Tablet(s) Oral at bedtime    IVF:    CULTURES:    RADIOLOGY & ADDITIONAL TESTS:    M54.16    Handoff    MEWS Score    Back pain    ALIZE (obstructive sleep apnea)    Stage 3 chronic kidney disease    HLD (hyperlipidemia)    HTN (hypertension)    Breast CA    Cholelithiases    Gastric ulcer    DM (diabetes mellitus)    OA (osteoarthritis)    Incontinence    Lumbar spinal stenosis    Spondylolisthesis of lumbar region    Lumbar spinal stenosis    Spondylolisthesis, lumbar region    Lumbar laminectomy    Fusion of posterior lumbar spine    H/O lumpectomy    History of appendectomy    H/O right hemicolectomy    H/O hernia repair    H/O arthroscopy of knee    Abnormal magnetic resonance cholangiopancreatography (MRCP)    History of colonoscopy    History of esophagogastroduodenoscopy (EGD)    S/P thyroid biopsy    SysAdmin_VstLnk        ASSESSMENT:  79F hx HLD, CAD, DM, HTN, low back pain with neurogenic claudication and RLE radiculopathy. Imaging with progressive L5-S1 spondy with severe stenosis L2-S1. Failed conservative management (extensive PT and multiple rounds of injections including ANNA, trigger points and RFA).  Now s/p L3-4 lami, L5-S1 TLIF (1/24).     NEURO:  - neuro/vitals q4  - pain control w ERAS   - pending standing x-rays   - HMV x2, monitor output    CARDS:  - -160   - hx HTN: c/w home labetalol, HOLD home losartan and amlodipine    PULM:  - face mask prn when sleeping, encourage IS   - hx ALIZE    GI:  - CCD  - bowel reg, last BM 1/23  - c/w home protonix    RENAL:  - IVL  - voiding    ENDO:  - ISS  - hx of DM: hold home metformin     HEME:  - DVT ppx w SCDs, holding SQL post op     ID:  - afebrile    DISPO: telemetry, full code, PT/OT rec AR    D/w Dr. Goodwin    Assessment:  Present when checked    []  GCS  E   V  M     Heart Failure: []Acute, [] acute on chronic , []chronic  Heart Failure:  [] Diastolic (HFpEF), [] Systolic (HFrEF), []Combined (HFpEF and HFrEF), [] RHF, [] Pulm HTN, [] Other    [] SEAN, [] ATN, [] AIN, [] other  [] CKD1, [] CKD2, [] CKD 3, [] CKD 4, [] CKD 5, []ESRD    Encephalopathy: [] Metabolic, [] Hepatic, [] toxic, [] Neurological, [] Other    Abnormal Nurtitional Status: [] malnurtition (see nutrition note), [ ]underweight: BMI < 19, [] morbid obesity: BMI >40, [] Cachexia    [] Sepsis  [] hypovolemic shock,[] cardiogenic shock, [] hemorrhagic shock, [] neuogenic shock  [] Acute Respiratory Failure  []Cerebral edema, [] Brain compression/ herniation,   [] Functional quadriplegia  [] Acute blood loss anemia

## 2025-01-26 NOTE — PROGRESS NOTE ADULT - ASSESSMENT
79F hx HLD, CAD, DM, HTN, low back pain with neurogenic claudication and RLE radiculopathy. The pt had imaging that showed progressive L5-S1 spondy with severe stenosis L2-S1. Failed conservative management Pt is now s/p L3-4 lami, L5-S1 TLIF.     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  # s/p L3-4 lami, L5-S1 TLIF  -Continue pain and bowel regimen as well as drain management as per NSGY     #HTN/HLD  #CAD   -Home medications: Labetalol 100mg BID, HCTZ 25mg daily, amlodipine 10mg daily and telmisartan 80mg daily   -Pt with low BP this morning with SBP in the 80's after AM labetalol  but asymptomatic   - Amlodipine was d/c'ed on 1/25/25 2/2 low BP and Losartan was d/c'ed this morning 2/2 low BP and increased creatinine   -Will continue labetalol 100mg BID with holding parameters    #Diabetes   -Home medication: metformin 500mg BID   -HgbA1c 6   -Will continue to monitor FS and ISS     #Acute blood anemia   -Likely 2/2 post op loss  - Pt reports that she takes Iron tablets at home 655mg daily   -Will continue to monitor serial CBCs   -Given pt's hx of CAD ( pt denied having knowledge of this CAD hx)  the pt was recommended for transfusion of 1U of PRBCs however the pt is reluctant at this time and the pt would prefer to repeat a CBC this afternoon prior to deciding on whether she will do the transfusion.   -I spoke to the pt and told her that I will attempt to contact her PCP Dr. Sascha Rincon and cardiologist Dr. Joslyn Bowling [phone] to attempt to obtain additional information about possible CAD hx.     #Gastric ulcer  -Continue PPI     #DVT prop  -As per primary team   -Will continue venodyne     #DISPO  -Pt was seen by PT and recommended for RACHEAL    55 minutes spent on total encounter. The necessity of the time spent during the encounter on this date of service was due to:    Review of hospital course, labs, vitals, radiology and medical records.  Direct patient encounter including bedside exam and interview.   Discussed plan of care with primary team.    Documenting the encounter.   79F hx HLD, CAD, DM, HTN, low back pain with neurogenic claudication and RLE radiculopathy. The pt had imaging that showed progressive L5-S1 spondy with severe stenosis L2-S1. Failed conservative management Pt is now s/p L3-4 lami, L5-S1 TLIF.     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  # s/p L3-4 lami, L5-S1 TLIF  -Continue pain and bowel regimen as well as drain management as per NSGY     #HTN/HLD  #CAD   -Home medications: Labetalol 100mg BID, HCTZ 25mg daily, amlodipine 10mg daily and telmisartan 80mg daily   -Pt with low BP this morning with SBP in the 80's after AM labetalol  but asymptomatic   - Amlodipine was d/c'ed on 1/25/25 2/2 low BP and Losartan was d/c'ed this morning 2/2 low BP and increased creatinine   -Will continue labetalol 100mg BID with holding parameters    #Diabetes   -Home medication: metformin 500mg BID   -HgbA1c 6   -Will continue to monitor FS and ISS     #Acute blood anemia   -Likely 2/2 post op loss  - Pt reports that she takes Iron tablets at home 655mg daily   -Will continue to monitor serial CBCs   -Given pt's hx of CAD ( pt denied having knowledge of this CAD hx)  the pt was recommended for transfusion of 1U of PRBCs however the pt is reluctant at this time and the pt would prefer to repeat a CBC this afternoon prior to deciding on whether she will do the transfusion.   -I  attempted to contact her PCP Dr. Sascha Rincon and cardiologist Dr. Joslyn Bowling [phone] to attempt to obtain additional information about possible CAD hx however the office was closed.     #Gastric ulcer  -Continue PPI     #DVT prop  -As per primary team   -Will continue venodyne     #DISPO  -Pt was seen by PT and recommended for RACHEAL    55 minutes spent on total encounter. The necessity of the time spent during the encounter on this date of service was due to:    Review of hospital course, labs, vitals, radiology and medical records.  Direct patient encounter including bedside exam and interview.   Discussed plan of care with primary team.    Documenting the encounter.   79F hx HLD,  DM, HTN, low back pain with neurogenic claudication and RLE radiculopathy. The pt had imaging that showed progressive L5-S1 spondy with severe stenosis L2-S1. Failed conservative management Pt is now s/p L3-4 lami, L5-S1 TLIF.     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  # s/p L3-4 lami, L5-S1 TLIF  -Continue pain and bowel regimen as well as drain management as per NSGY     #HTN/HLD  #CAD   -Home medications: Labetalol 100mg BID, HCTZ 25mg daily, amlodipine 10mg daily and telmisartan 80mg daily   -Pt with low BP this morning with SBP in the 80's after AM labetalol  but asymptomatic   - Amlodipine was d/c'ed on 1/25/25 2/2 low BP and Losartan was d/c'ed this morning 2/2 low BP and increased creatinine   -Will continue labetalol 100mg BID with holding parameters    #Diabetes   -Home medication: metformin 500mg BID   -HgbA1c 6   -Will continue to monitor FS and ISS     #Acute blood anemia   -Likely 2/2 post op loss  - Pt reports that she takes Iron tablets at home 655mg daily   -Will continue to monitor serial CBCs   -Given pt's hx of CAD ( pt denied having knowledge of this CAD hx)  the pt was recommended for transfusion of 1U of PRBCs however the pt is reluctant at this time and the pt would prefer to repeat a CBC this afternoon prior to deciding on whether she will do the transfusion.   -I  attempted to contact her PCP Dr. Sascha Rincon and cardiologist Dr. Joslyn Bowling [phone] to attempt to obtain additional information about possible CAD hx. I spoke to the cardiologist covering for Dr Bowling who stated that there was no documented hx of CAD in her chart. In addition the covering cardiology stated that the pt did not have  hx of a stress test or cardiac cath documented in her chart. Pt also denied hx of cardiac cath in the past.     #Gastric ulcer  -Continue PPI     #DVT prop  -As per primary team   -Will continue venodyne     #DISPO  -Pt was seen by PT and recommended for ARCHEAL    55 minutes spent on total encounter. The necessity of the time spent during the encounter on this date of service was due to:    Review of hospital course, labs, vitals, radiology and medical records.  Direct patient encounter including bedside exam and interview.   Discussed plan of care with primary team.    Documenting the encounter.   79F hx HLD,  DM, HTN, low back pain with neurogenic claudication and RLE radiculopathy. The pt had imaging that showed progressive L5-S1 spondy with severe stenosis L2-S1. Failed conservative management Pt is now s/p L3-4 lami, L5-S1 TLIF.     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  # s/p L3-4 lami, L5-S1 TLIF  -Continue pain and bowel regimen as well as drain management as per NSGY     #HTN/HLD  #CAD   -Home medications: Labetalol 100mg BID, HCTZ 25mg daily, amlodipine 10mg daily and telmisartan 80mg daily   -Pt with low BP this morning with SBP in the 80's after AM labetalol  but asymptomatic   - Amlodipine was d/c'ed on 1/25/25 2/2 low BP and Losartan was d/c'ed this morning 2/2 low BP and increased creatinine   -Will continue labetalol 100mg BID with holding parameters    #Diabetes   -Home medication: metformin 500mg BID   -HgbA1c 6   -Will continue to monitor FS and ISS     #Acute blood anemia   -Likely 2/2 post op loss  - Pt reports that she takes Iron tablets at home 655mg daily   -Will continue to monitor serial CBCs   -Given pt's hx of CAD ( pt denied having knowledge of this CAD hx)  the pt was recommended for transfusion of 1U of PRBCs however the pt is reluctant at this time and the pt would prefer to repeat a CBC this afternoon prior to deciding on whether she will do the transfusion.   -I  attempted to contact her PCP Dr. Sascha Rincon and cardiologist Dr. Joslyn Bowling [phone] to attempt to obtain additional information about possible CAD hx. I spoke to the cardiologist covering for Dr Bowling who stated that there was no documented hx of CAD in her outpatient chart. In addition the covering cardiology stated that the pt did not have  hx of a stress test or cardiac cath documented in her chart. Pt also denied hx of cardiac cath in the past.     #Gastric ulcer  -Continue PPI     #DVT prop  -As per primary team   -Will continue venodyne     #DISPO  -Pt was seen by PT and recommended for RACHEAL    55 minutes spent on total encounter. The necessity of the time spent during the encounter on this date of service was due to:    Review of hospital course, labs, vitals, radiology and medical records.  Direct patient encounter including bedside exam and interview.   Discussed plan of care with primary team.    Documenting the encounter.   79F hx HLD,  DM, HTN, questionable hx of CAD, low back pain with neurogenic claudication and RLE radiculopathy. The pt had imaging that showed progressive L5-S1 spondy with severe stenosis L2-S1. Failed conservative management Pt is now s/p L3-4 lami, L5-S1 TLIF.     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  # s/p L3-4 lami, L5-S1 TLIF  -Continue pain and bowel regimen as well as drain management as per NSGY     #HTN/HLD  #CAD   -Home medications: Labetalol 100mg BID, HCTZ 25mg daily, amlodipine 10mg daily and telmisartan 80mg daily   -Pt with low BP this morning with SBP in the 80's after AM labetalol  but asymptomatic   - Amlodipine was d/c'ed on 1/25/25 2/2 low BP and Losartan was d/c'ed this morning 2/2 low BP and increased creatinine   -Will continue labetalol 100mg BID with holding parameters    #Diabetes   -Home medication: metformin 500mg BID   -HgbA1c 6   -Will continue to monitor FS and ISS     #Acute blood anemia   -Likely 2/2 post op loss  - Pt reports that she takes Iron tablets at home 655mg daily   -Will continue to monitor serial CBCs   -Given pt's hx of CAD ( pt denied having knowledge of this CAD hx)  the pt was recommended for transfusion of 1U of PRBCs however the pt is reluctant at this time and the pt would prefer to repeat a CBC this afternoon prior to deciding on whether she will do the transfusion.   -I  attempted to contact her PCP Dr. Sascha Rincon and cardiologist Dr. Joslyn Bowling [phone] to attempt to obtain additional information about possible CAD hx. I spoke to the cardiologist covering for Dr Bowling who stated that there was no documented hx of CAD in her outpatient chart. In addition the covering cardiology stated that the pt did not have  hx of a stress test or cardiac cath documented in her chart. Pt also denied hx of cardiac cath in the past.     #Gastric ulcer  -Continue PPI     #DVT prop  -As per primary team   -Will continue venodyne     #DISPO  -Pt was seen by PT and recommended for RACHEAL    55 minutes spent on total encounter. The necessity of the time spent during the encounter on this date of service was due to:    Review of hospital course, labs, vitals, radiology and medical records.  Direct patient encounter including bedside exam and interview.   Discussed plan of care with primary team.    Documenting the encounter.

## 2025-01-26 NOTE — PROVIDER CONTACT NOTE (OTHER) - RECOMMENDATIONS
Requested for O2 NC order adjustment
Placed on O2 28 % VM with O2 sat improved to 93%- requested for such order

## 2025-01-27 PROBLEM — G47.33 OBSTRUCTIVE SLEEP APNEA (ADULT) (PEDIATRIC): Chronic | Status: ACTIVE | Noted: 2025-01-23

## 2025-01-27 PROBLEM — K80.20 CALCULUS OF GALLBLADDER WITHOUT CHOLECYSTITIS WITHOUT OBSTRUCTION: Chronic | Status: ACTIVE | Noted: 2025-01-23

## 2025-01-27 PROBLEM — I10 ESSENTIAL (PRIMARY) HYPERTENSION: Chronic | Status: ACTIVE | Noted: 2025-01-23

## 2025-01-27 PROBLEM — E78.5 HYPERLIPIDEMIA, UNSPECIFIED: Chronic | Status: ACTIVE | Noted: 2025-01-23

## 2025-01-27 PROBLEM — E11.9 TYPE 2 DIABETES MELLITUS WITHOUT COMPLICATIONS: Chronic | Status: ACTIVE | Noted: 2025-01-23

## 2025-01-27 PROBLEM — N18.30 CHRONIC KIDNEY DISEASE, STAGE 3 UNSPECIFIED: Chronic | Status: ACTIVE | Noted: 2025-01-23

## 2025-01-27 PROBLEM — K25.9 GASTRIC ULCER, UNSPECIFIED AS ACUTE OR CHRONIC, WITHOUT HEMORRHAGE OR PERFORATION: Chronic | Status: ACTIVE | Noted: 2025-01-23

## 2025-01-27 PROBLEM — R32 UNSPECIFIED URINARY INCONTINENCE: Chronic | Status: ACTIVE | Noted: 2025-01-23

## 2025-01-27 PROBLEM — C50.919 MALIGNANT NEOPLASM OF UNSPECIFIED SITE OF UNSPECIFIED FEMALE BREAST: Chronic | Status: ACTIVE | Noted: 2025-01-23

## 2025-01-27 PROBLEM — M19.90 UNSPECIFIED OSTEOARTHRITIS, UNSPECIFIED SITE: Chronic | Status: ACTIVE | Noted: 2025-01-23

## 2025-01-27 LAB
ANION GAP SERPL CALC-SCNC: 9 MMOL/L — SIGNIFICANT CHANGE UP (ref 5–17)
BUN SERPL-MCNC: 36 MG/DL — HIGH (ref 7–23)
CALCIUM SERPL-MCNC: 8.6 MG/DL — SIGNIFICANT CHANGE UP (ref 8.4–10.5)
CHLORIDE SERPL-SCNC: 104 MMOL/L — SIGNIFICANT CHANGE UP (ref 96–108)
CO2 SERPL-SCNC: 22 MMOL/L — SIGNIFICANT CHANGE UP (ref 22–31)
CREAT SERPL-MCNC: 1.21 MG/DL — SIGNIFICANT CHANGE UP (ref 0.5–1.3)
EGFR: 46 ML/MIN/1.73M2 — LOW
GLUCOSE SERPL-MCNC: 138 MG/DL — HIGH (ref 70–99)
HCT VFR BLD CALC: 25 % — LOW (ref 34.5–45)
HGB BLD-MCNC: 7.5 G/DL — LOW (ref 11.5–15.5)
MAGNESIUM SERPL-MCNC: 2.2 MG/DL — SIGNIFICANT CHANGE UP (ref 1.6–2.6)
MCHC RBC-ENTMCNC: 25.3 PG — LOW (ref 27–34)
MCHC RBC-ENTMCNC: 30 G/DL — LOW (ref 32–36)
MCV RBC AUTO: 84.5 FL — SIGNIFICANT CHANGE UP (ref 80–100)
NRBC # BLD: 0 /100 WBCS — SIGNIFICANT CHANGE UP (ref 0–0)
NRBC BLD-RTO: 0 /100 WBCS — SIGNIFICANT CHANGE UP (ref 0–0)
PHOSPHATE SERPL-MCNC: 2.8 MG/DL — SIGNIFICANT CHANGE UP (ref 2.5–4.5)
PLATELET # BLD AUTO: 171 K/UL — SIGNIFICANT CHANGE UP (ref 150–400)
POTASSIUM SERPL-MCNC: 4.3 MMOL/L — SIGNIFICANT CHANGE UP (ref 3.5–5.3)
POTASSIUM SERPL-SCNC: 4.3 MMOL/L — SIGNIFICANT CHANGE UP (ref 3.5–5.3)
RBC # BLD: 2.96 M/UL — LOW (ref 3.8–5.2)
RBC # FLD: 14.6 % — HIGH (ref 10.3–14.5)
SODIUM SERPL-SCNC: 135 MMOL/L — SIGNIFICANT CHANGE UP (ref 135–145)
WBC # BLD: 6.07 K/UL — SIGNIFICANT CHANGE UP (ref 3.8–10.5)
WBC # FLD AUTO: 6.07 K/UL — SIGNIFICANT CHANGE UP (ref 3.8–10.5)

## 2025-01-27 PROCEDURE — 99233 SBSQ HOSP IP/OBS HIGH 50: CPT

## 2025-01-27 PROCEDURE — 99024 POSTOP FOLLOW-UP VISIT: CPT

## 2025-01-27 PROCEDURE — 72100 X-RAY EXAM L-S SPINE 2/3 VWS: CPT | Mod: 26

## 2025-01-27 RX ORDER — POLYETHYLENE GLYCOL 3350 17 G/17G
17 POWDER, FOR SOLUTION ORAL
Refills: 0 | Status: DISCONTINUED | OUTPATIENT
Start: 2025-01-27 | End: 2025-01-28

## 2025-01-27 RX ORDER — SODIUM PHOSPHATE, DIBASIC, ANHYDROUS, POTASSIUM PHOSPHATE, MONOBASIC, AND SODIUM PHOSPHATE, MONOBASIC, MONOHYDRATE 852; 155; 130 MG/1; MG/1; MG/1
1 TABLET, COATED ORAL ONCE
Refills: 0 | Status: COMPLETED | OUTPATIENT
Start: 2025-01-27 | End: 2025-01-27

## 2025-01-27 RX ORDER — BISACODYL 5 MG
5 TABLET, DELAYED RELEASE (ENTERIC COATED) ORAL EVERY 12 HOURS
Refills: 0 | Status: DISCONTINUED | OUTPATIENT
Start: 2025-01-27 | End: 2025-01-30

## 2025-01-27 RX ADMIN — Medication 500 MILLIGRAM(S): at 06:01

## 2025-01-27 RX ADMIN — PREGABALIN CAPSULES, CV 50 MILLIGRAM(S): 225 CAPSULE ORAL at 06:01

## 2025-01-27 RX ADMIN — POLYETHYLENE GLYCOL 3350 17 GRAM(S): 17 POWDER, FOR SOLUTION ORAL at 11:25

## 2025-01-27 RX ADMIN — PANTOPRAZOLE 40 MILLIGRAM(S): 20 TABLET, DELAYED RELEASE ORAL at 06:02

## 2025-01-27 RX ADMIN — Medication 2 TABLET(S): at 21:38

## 2025-01-27 RX ADMIN — Medication 500 MILLIGRAM(S): at 13:11

## 2025-01-27 RX ADMIN — ACETAMINOPHEN 1000 MILLIGRAM(S): 160 SUSPENSION ORAL at 21:38

## 2025-01-27 RX ADMIN — ACETAMINOPHEN 1000 MILLIGRAM(S): 160 SUSPENSION ORAL at 22:38

## 2025-01-27 RX ADMIN — ACETAMINOPHEN 1000 MILLIGRAM(S): 160 SUSPENSION ORAL at 13:11

## 2025-01-27 RX ADMIN — ENOXAPARIN SODIUM 40 MILLIGRAM(S): 100 INJECTION SUBCUTANEOUS at 21:38

## 2025-01-27 RX ADMIN — Medication 2: at 12:23

## 2025-01-27 RX ADMIN — SODIUM PHOSPHATE, DIBASIC, ANHYDROUS, POTASSIUM PHOSPHATE, MONOBASIC, AND SODIUM PHOSPHATE, MONOBASIC, MONOHYDRATE 1 PACKET(S): 852; 155; 130 TABLET, COATED ORAL at 11:25

## 2025-01-27 RX ADMIN — ACETAMINOPHEN 1000 MILLIGRAM(S): 160 SUSPENSION ORAL at 06:01

## 2025-01-27 RX ADMIN — Medication 5 MILLIGRAM(S): at 23:23

## 2025-01-27 RX ADMIN — LABETALOL HYDROCHLORIDE 75 MILLIGRAM(S): 300 TABLET, FILM COATED ORAL at 17:13

## 2025-01-27 RX ADMIN — Medication 500 MILLIGRAM(S): at 21:38

## 2025-01-27 NOTE — PROGRESS NOTE ADULT - SUBJECTIVE AND OBJECTIVE BOX
O/N Events:  Subjective/ROS: Denies HA, CP, SOB, n/v, changes in bowel/urinary habits.  12pt ROS otherwise negative.    VITALS  Vital Signs Last 24 Hrs  T(C): 36.3 (27 Jan 2025 08:20), Max: 37.7 (26 Jan 2025 20:45)  T(F): 97.4 (27 Jan 2025 08:20), Max: 99.9 (26 Jan 2025 20:45)  HR: 63 (27 Jan 2025 08:20) (63 - 86)  BP: 119/72 (27 Jan 2025 08:20) (99/51 - 124/70)  BP(mean): --  RR: 18 (27 Jan 2025 08:20) (17 - 19)  SpO2: 97% (27 Jan 2025 08:20) (94% - 97%)    Parameters below as of 27 Jan 2025 08:20  Patient On (Oxygen Delivery Method): room air        I&O's Summary    26 Jan 2025 07:01  -  27 Jan 2025 07:00  --------------------------------------------------------  IN: 2550 mL / OUT: 2315 mL / NET: 235 mL    27 Jan 2025 07:01  -  27 Jan 2025 12:07  --------------------------------------------------------  IN: 0 mL / OUT: 200 mL / NET: -200 mL        CAPILLARY BLOOD GLUCOSE      POCT Blood Glucose.: 185 mg/dL (27 Jan 2025 11:44)  POCT Blood Glucose.: 143 mg/dL (27 Jan 2025 07:40)  POCT Blood Glucose.: 136 mg/dL (26 Jan 2025 22:42)  POCT Blood Glucose.: 144 mg/dL (26 Jan 2025 17:53)      PHYSICAL EXAM  Gen: NAD sitting up in bed  CV: RRR, +S1/S2, no mumur  Pulm: CTA b/l no wheezing or crackles   Abd: soft, NTND + BS no rebound or guarding + HMV drain in place   Neuro: AAOX3; nonfocal     MEDICATIONS  (STANDING):  acetaminophen     Tablet .. 1000 milliGRAM(s) Oral every 8 hours  enoxaparin Injectable 40 milliGRAM(s) SubCutaneous every 24 hours  insulin lispro (ADMELOG) corrective regimen sliding scale   SubCutaneous Before meals and at bedtime  labetalol 75 milliGRAM(s) Oral every 12 hours  methocarbamol 500 milliGRAM(s) Oral every 8 hours  ondansetron   Disintegrating Tablet 4 milliGRAM(s) Oral every 6 hours  pantoprazole    Tablet 40 milliGRAM(s) Oral before breakfast  polyethylene glycol 3350 17 Gram(s) Oral daily  senna 2 Tablet(s) Oral at bedtime    MEDICATIONS  (PRN):  oxyCODONE    IR 5 milliGRAM(s) Oral every 4 hours PRN Severe Pain (7 - 10)      No Known Allergies      LABS                        7.5    6.07  )-----------( 171      ( 27 Jan 2025 08:30 )             25.0     01-27    135  |  104  |  36[H]  ----------------------------<  138[H]  4.3   |  22  |  1.21    Ca    8.6      27 Jan 2025 08:30  Phos  2.8     01-27  Mg     2.2     01-27        Urinalysis Basic - ( 27 Jan 2025 08:30 )    Color: x / Appearance: x / SG: x / pH: x  Gluc: 138 mg/dL / Ketone: x  / Bili: x / Urobili: x   Blood: x / Protein: x / Nitrite: x   Leuk Esterase: x / RBC: x / WBC x   Sq Epi: x / Non Sq Epi: x / Bacteria: x            IMAGING/EKG/ETC: reviewed

## 2025-01-27 NOTE — PROGRESS NOTE ADULT - SUBJECTIVE AND OBJECTIVE BOX
HPI:  79F hx HLD, CAD, DM, HTN, low back pain with neurogenic claudication and RLE radiculopathy. Imaging with progressive L5-S1 spondy with severe stenosis L2-S1. Failed conservative management (extensive PT and multiple rounds of injections including ANNA, trigger points and RFA). (24 Jan 2025 12:39)      Subjective:  JUSTIN overnight. Pain controlled.   Hospital course:  1/24: POD 0 from L3-4 lami, L5-S1 TLIF. 500cc bolus for SBP 90s.   1/25. POD 1. JUSTIN overnight. Worked w PT/OT, rec for AR. Amlodipine dc'd per hospitalist. Losartan held.  1/26: POD2, o/n 500cc bolus NS given for low urine production. 1L NS bolus given for SEAN and hypotension. Hgb 7.4. Pt refused transfusion consent. Pending PM labs. Decr. labetalol to 75mg BID.   1/27: POD3, JUSTIN overnight      Vital Signs Last 24 Hrs  T(C): 36.6 (27 Jan 2025 00:37), Max: 37.7 (26 Jan 2025 20:45)  T(F): 97.8 (27 Jan 2025 00:37), Max: 99.9 (26 Jan 2025 20:45)  HR: 78 (27 Jan 2025 00:37) (70 - 86)  BP: 124/70 (27 Jan 2025 00:37) (86/46 - 124/70)  BP(mean): 79 (26 Jan 2025 06:45) (79 - 79)  RR: 17 (27 Jan 2025 00:37) (17 - 18)  SpO2: 94% (27 Jan 2025 00:37) (93% - 96%)    Parameters below as of 27 Jan 2025 00:37  Patient On (Oxygen Delivery Method): room air        I&O's Summary    25 Jan 2025 07:01  -  26 Jan 2025 07:00  --------------------------------------------------------  IN: 1650 mL / OUT: 977.5 mL / NET: 672.5 mL    26 Jan 2025 07:01  -  27 Jan 2025 01:34  --------------------------------------------------------  IN: 2550 mL / OUT: 1530 mL / NET: 1020 mL        PHYSICAL EXAM:  General: patient seen laying supine in bed in NAD  Neuro: AAOx3, follows commands, opens eyes spontaneously, speech clear and fluent,  face symmetric,  strength 5/5 b/l upper extremities and lower extremities, sensation intact to light touch throughout  HEENT: PERRL  Neck: supple  Cardiac: RRR, S1S2  Pulmonary: chest rise symmetric  Abdomen: soft, nontender, nondistended  Ext: perfusing well  Skin: warm, dry  Wound: posterior lumbar incision dressing c/d/i, HMV x 2        LABS:                        8.1    8.61  )-----------( 162      ( 26 Jan 2025 16:00 )             27.1     01-26    136  |  105  |  39[H]  ----------------------------<  118[H]  4.5   |  20[L]  |  1.37[H]    Ca    8.0[L]      26 Jan 2025 16:00  Phos  3.5     01-26  Mg     2.1     01-26        Urinalysis Basic - ( 26 Jan 2025 16:00 )    Color: x / Appearance: x / SG: x / pH: x  Gluc: 118 mg/dL / Ketone: x  / Bili: x / Urobili: x   Blood: x / Protein: x / Nitrite: x   Leuk Esterase: x / RBC: x / WBC x   Sq Epi: x / Non Sq Epi: x / Bacteria: x          CAPILLARY BLOOD GLUCOSE      POCT Blood Glucose.: 136 mg/dL (26 Jan 2025 22:42)  POCT Blood Glucose.: 144 mg/dL (26 Jan 2025 17:53)  POCT Blood Glucose.: 202 mg/dL (26 Jan 2025 11:52)  POCT Blood Glucose.: 147 mg/dL (26 Jan 2025 07:55)      Drug Levels: [] N/A    CSF Analysis: [] N/A      Allergies    No Known Allergies    Intolerances      MEDICATIONS:  Antibiotics:    Neuro:  acetaminophen     Tablet .. 1000 milliGRAM(s) Oral every 8 hours  HYDROmorphone  Injectable 0.5 milliGRAM(s) IV Push every 4 hours PRN  methocarbamol 500 milliGRAM(s) Oral every 8 hours  ondansetron   Disintegrating Tablet 4 milliGRAM(s) Oral every 6 hours  oxyCODONE    IR 5 milliGRAM(s) Oral every 4 hours PRN  pregabalin 50 milliGRAM(s) Oral three times a day    Anticoagulation:  enoxaparin Injectable 40 milliGRAM(s) SubCutaneous every 24 hours    OTHER:  insulin lispro (ADMELOG) corrective regimen sliding scale   SubCutaneous three times a day before meals  labetalol 75 milliGRAM(s) Oral every 12 hours  pantoprazole    Tablet 40 milliGRAM(s) Oral before breakfast  polyethylene glycol 3350 17 Gram(s) Oral daily  senna 2 Tablet(s) Oral at bedtime    IVF:    CULTURES:    RADIOLOGY & ADDITIONAL TESTS:    M54.16    Handoff    MEWS Score    Back pain    ALIZE (obstructive sleep apnea)    Stage 3 chronic kidney disease    HLD (hyperlipidemia)    HTN (hypertension)    Breast CA    Cholelithiases    Gastric ulcer    DM (diabetes mellitus)    OA (osteoarthritis)    Incontinence    Lumbar spinal stenosis    Spondylolisthesis of lumbar region    Lumbar spinal stenosis    Spondylolisthesis, lumbar region    Lumbar laminectomy    Fusion of posterior lumbar spine    H/O lumpectomy    History of appendectomy    H/O right hemicolectomy    H/O hernia repair    H/O arthroscopy of knee    Abnormal magnetic resonance cholangiopancreatography (MRCP)    History of colonoscopy    History of esophagogastroduodenoscopy (EGD)    S/P thyroid biopsy    [email or web address]        ASSESSMENT:  79F hx HLD, CAD, DM, HTN, low back pain with neurogenic claudication and RLE radiculopathy. Imaging with progressive L5-S1 spondy with severe stenosis L2-S1. Failed conservative management (extensive PT and multiple rounds of injections including ANNA, trigger points and RFA).  Now s/p L3-4 lami, L5-S1 TLIF (1/24).     NEURO:  - neuro/vitals q4  - pain control w ERAS   - pending standing x-rays   - HMV x2, monitor output    CARDS:  - -160   - hx HTN: labetalol 75mg PO BID (decr from home dose 100mg BID), HOLD home losartan and amlodipine    PULM:  - face mask prn when sleeping, encourage IS   - hx ALIZE    GI:  - CCD  - bowel reg, last BM 1/23  - c/w home protonix    RENAL:  - IVL  - voiding    ENDO:  - ISS  - hx of DM: hold home metformin     HEME:  - DVT ppx w SCDs, holding SQL post op   - post op anemia: pt refusing transfusion     ID:  - afebrile    DISPO: telemetry, full code, PT/OT rec RACHEAL    D/w Dr. Goodwin    Assessment:  Present when checked    []  GCS  E   V  M     Heart Failure: []Acute, [] acute on chronic , []chronic  Heart Failure:  [] Diastolic (HFpEF), [] Systolic (HFrEF), []Combined (HFpEF and HFrEF), [] RHF, [] Pulm HTN, [] Other    [] SEAN, [] ATN, [] AIN, [] other  [] CKD1, [] CKD2, [] CKD 3, [] CKD 4, [] CKD 5, []ESRD    Encephalopathy: [] Metabolic, [] Hepatic, [] toxic, [] Neurological, [] Other    Abnormal Nurtitional Status: [] malnurtition (see nutrition note), [ ]underweight: BMI < 19, [] morbid obesity: BMI >40, [] Cachexia    [] Sepsis  [] hypovolemic shock,[] cardiogenic shock, [] hemorrhagic shock, [] neuogenic shock  [] Acute Respiratory Failure  []Cerebral edema, [] Brain compression/ herniation,   [] Functional quadriplegia  [] Acute blood loss anemia

## 2025-01-27 NOTE — CONSULT NOTE ADULT - SUBJECTIVE AND OBJECTIVE BOX
Pt seen and evaluated at bedside.  In summary the pt is a 79F hx HLD, CAD, DM, HTN, low back pain with neurogenic claudication and RLE radiculopathy. The pt had imaging that showed  progressive L5-S1 spondy with severe stenosis L2-S1. Failed conservative management Pt is now s/p L3-4 lami, L5-S1 TLIF.     PAST MEDICAL & SURGICAL HISTORY:  Back pain  ALIZE (obstructive sleep apnea) not on CPAP  Stage III chronic kidney disease  HLD (hyperlipidemia)  HTN (hypertension)  Left sided Breast CA  Cholelithiases  Gastric ulcer  DM (diabetes mellitus)  OA (osteoarthritis) right  knee  Incontinence  H/O lumpectomy left 2010  History of appendectomy  H/O right hemicolectomy 2016  H/O hernia repair 2020  H/O arthroscopy of kneeright 2001  Abnormal magnetic resonance cholangiopancreatography (MRCP)  2018  History of colonoscopy  History of esophagogastroduodenoscopy (EGD)  S/P thyroid biopsy    Home Medications:  amLODIPine 10 mg oral tablet: 1 tab(s) orally once a day (24 Jan 2025 08:44)  ferrous sulfate: once a day 325mg (24 Jan 2025 08:44)  hydroCHLOROthiazide 25 mg oral tablet: 1 tab(s) orally once a day (24 Jan 2025 08:44)  Imodium 2 mg oral capsule: 1 cap(s) orally prn (24 Jan 2025 08:44)  labetalol 100 mg oral tablet: 1 tab(s) orally 2 times a day (24 Jan 2025 08:44)  metFORMIN 500 mg oral tablet: 1 tab(s) orally 2 times a day (24 Jan 2025 08:44)  pantoprazole 40 mg oral delayed release tablet: 1 tab(s) orally once a day (24 Jan 2025 08:44)  telmisartan 80 mg oral tablet: 1 tab(s) orally once a day (24 Jan 2025 08:44)      Allergies: No Known Allergies      VITAL SIGNS, INS/OUTS (last 24 hours):  Vital Signs Last 24 Hrs  T(C): 36.5 (25 Jan 2025 14:09), Max: 36.8 (25 Jan 2025 00:27)  T(F): 97.7 (25 Jan 2025 14:09), Max: 98.3 (25 Jan 2025 11:33)  HR: 74 (25 Jan 2025 14:09) (58 - 84)  BP: 103/51 (25 Jan 2025 14:09) (93/50 - 113/79)  BP(mean): 91 (25 Jan 2025 07:25) (68 - 91)  RR: 17 (25 Jan 2025 14:09) (12 - 20)  SpO2: 96% (25 Jan 2025 14:09) (89% - 99%)    Parameters below as of 25 Jan 2025 14:09  Patient On (Oxygen Delivery Method): room air      PHYSICAL EXAM:  NAD laying in bed  CTA b/l no wheezing or crackles  NL S1,S2 no mumurs   soft NT/ND + BS no rebound or guarding   +HMV drain in place   no calf tenderness   AAox3; sensation intact; strength 5/5 b/l UE and LE; follows commands     BASIC LABS:                        8.2    7.61  )-----------( 176      ( 25 Jan 2025 07:59 )             25.9     01-25    141  |  111[H]  |  26[H]  ----------------------------<  148[H]  3.8   |  19[L]  |  1.02    Ca    7.0[L]      25 Jan 2025 05:42  Phos  4.3     01-25  Mg     1.7     01-25    CAPILLARY BLOOD GLUCOSE  POCT Blood Glucose.: 175 mg/dL (25 Jan 2025 11:49)  POCT Blood Glucose.: 157 mg/dL (25 Jan 2025 07:10)  POCT Blood Glucose.: 191 mg/dL (24 Jan 2025 22:31)    CURRENT MEDICATIONS:   acetaminophen     Tablet .. 1000 milliGRAM(s) Oral every 8 hours  amLODIPine   Tablet 10 milliGRAM(s) Oral daily  HYDROmorphone  Injectable 0.5 milliGRAM(s) IV Push every 4 hours PRN  insulin lispro (ADMELOG) corrective regimen sliding scale   SubCutaneous three times a day before meals  labetalol 100 milliGRAM(s) Oral two times a day  losartan 100 milliGRAM(s) Oral daily  methocarbamol 500 milliGRAM(s) Oral every 8 hours  ondansetron   Disintegrating Tablet 4 milliGRAM(s) Oral every 6 hours  oxyCODONE    IR 5 milliGRAM(s) Oral every 4 hours PRN  pantoprazole    Tablet 40 milliGRAM(s) Oral before breakfast  polyethylene glycol 3350 17 Gram(s) Oral daily  pregabalin 50 milliGRAM(s) Oral three times a day  senna 2 Tablet(s) Oral at bedtime  sodium chloride 0.9%. 1000 milliLiter(s) IV Continuous <Continuous>        
NEUROSURGERY PAIN MANAGEMENT CONSULT NOTE    Chief Complaint: low back pain radiating down RLE     HPI:  79F hx HLD, CAD, DM, HTN, low back pain with neurogenic claudication and RLE radiculopathy. Imaging with progressive L5-S1 spondy with severe stenosis L2-S1. Failed conservative management (extensive PT and multiple rounds of injections including ANNA, trigger points and RFA). (24 Jan 2025 12:39)      PAST MEDICAL & SURGICAL HISTORY:  Back pain      ALIZE (obstructive sleep apnea)  no CPAT      Stage 3 chronic kidney disease      HLD (hyperlipidemia)      HTN (hypertension)      Breast CA  left      Cholelithiases      Gastric ulcer      DM (diabetes mellitus)      OA (osteoarthritis)  r knee      Incontinence      H/O lumpectomy  left 2010      History of appendectomy      H/O right hemicolectomy  2016      H/O hernia repair  2020      H/O arthroscopy of knee  right 2001      Abnormal magnetic resonance cholangiopancreatography (MRCP)  2018      History of colonoscopy      History of esophagogastroduodenoscopy (EGD)      S/P thyroid biopsy          FAMILY HISTORY:      SOCIAL HISTORY:  [ ] Denies Smoking, Alcohol, or Drug Use    HOME MEDICATIONS:   Please refer to initial HNP    PAIN HOME MEDICATIONS:    Allergies    No Known Allergies    Intolerances        PAIN MEDICATIONS:  acetaminophen     Tablet .. 1000 milliGRAM(s) Oral every 8 hours  methocarbamol 500 milliGRAM(s) Oral every 8 hours  ondansetron   Disintegrating Tablet 4 milliGRAM(s) Oral every 6 hours  oxyCODONE    IR 5 milliGRAM(s) Oral every 4 hours PRN    Heme:  enoxaparin Injectable 40 milliGRAM(s) SubCutaneous every 24 hours    Antibiotics:    Cardiovascular:  labetalol 75 milliGRAM(s) Oral every 12 hours    GI:  pantoprazole    Tablet 40 milliGRAM(s) Oral before breakfast  polyethylene glycol 3350 17 Gram(s) Oral daily  senna 2 Tablet(s) Oral at bedtime    Endocrine:  insulin lispro (ADMELOG) corrective regimen sliding scale   SubCutaneous Before meals and at bedtime    All Other Medications:      Vital Signs Last 24 Hrs  T(C): 36.5 (27 Jan 2025 12:13), Max: 37.7 (26 Jan 2025 20:45)  T(F): 97.7 (27 Jan 2025 12:13), Max: 99.9 (26 Jan 2025 20:45)  HR: 69 (27 Jan 2025 12:13) (63 - 78)  BP: 121/57 (27 Jan 2025 12:13) (99/51 - 124/70)  BP(mean): --  RR: 18 (27 Jan 2025 12:13) (17 - 19)  SpO2: 95% (27 Jan 2025 12:13) (94% - 97%)    Parameters below as of 27 Jan 2025 12:13  Patient On (Oxygen Delivery Method): room air        LABS:                        7.5    6.07  )-----------( 171      ( 27 Jan 2025 08:30 )             25.0     01-27    135  |  104  |  36[H]  ----------------------------<  138[H]  4.3   |  22  |  1.21    Ca    8.6      27 Jan 2025 08:30  Phos  2.8     01-27  Mg     2.2     01-27        Urinalysis Basic - ( 27 Jan 2025 08:30 )    Color: x / Appearance: x / SG: x / pH: x  Gluc: 138 mg/dL / Ketone: x  / Bili: x / Urobili: x   Blood: x / Protein: x / Nitrite: x   Leuk Esterase: x / RBC: x / WBC x   Sq Epi: x / Non Sq Epi: x / Bacteria: x        RADIOLOGY:    Drug Screen:        REVIEW OF SYSTEMS:  CONSTITUTIONAL: No fever or fatigue O/N.   EYES: No eye pain, visual disturbances  ENMT:  No difficulty hearing. No throat pain  NECK: No pain or stiffness  RESPIRATORY: No cough, wheezing; No shortness of breath  CARDIOVASCULAR: No chest pain, palpitations.   GASTROINTESTINAL: Pt reports passing gas. No bowel movements. No abdominal or epigastric pain. No nausea, vomiting. GENITOURINARY: No dysuria, frequency, or incontinence  NEUROLOGICAL: No headaches, loss of strength, numbness, or tremors. No dizzinesss or lightheadedness with pain medications.   MUSCULOSKELETAL: Incisional back pain. No joint pain or swelling; No muscle, or extremity pain    PAIN ASSESSMENT: pain overall well controlled, c/o new visual issues reading on her phone     PHYSICAL EXAM  GENERAL: Laying in bed, NAD  Neuro: CN II-XII PERRRL, EOMI  Cranial nerves grossly intact  Motor exam: MAEx4  Sensation intact to LT in UE/LE in 3 dermatomes  CHEST/LUNG: Clear to auscultation bilaterally; No rales, rhonchi, wheezing, or rubs  HEART: Regular rate and rhythm; No murmurs, rubs, or gallops  ABDOMEN: Soft, Nontender, Nondistended; Bowel sounds present  EXTREMITIES:  2+ Peripheral Pulses, No clubbing, cyanosis, or edema  SKIN: No rashes or lesions      ASSESSMENT:   79F hx HLD, CAD, DM, HTN, low back pain with neurogenic claudication and RLE radiculopathy. Imaging with progressive L5-S1 spondy with severe stenosis L2-S1. Failed conservative management (extensive PT and multiple rounds of injections including ANNA, trigger points and RFA).  Now s/p L3-4 lami, L5-S1 TLIF (1/24).       PLAN:   - Pain:  Tylenol 1000mg every 8 hours  Stop Lyrica  Robaxin 500mg every 8 hours   Oxycodone 5mg every 4 hours as needed for severe pain    - Bowel regimen: Senna    - Nausea ppx: Zofran standing  - Functional Goals: Pt will get OOB with PT today. Pt will resume previous level of activity without impairment from surgery.   - Additional Consults: None recommended.   - Additional Labs/Imaging:  None recommended.   - Follow up, Discharge Planning: pending rehab  - Pain Management follow up plan: will continue to follow    d/w Dr. Martinez

## 2025-01-27 NOTE — PROGRESS NOTE ADULT - ASSESSMENT
79F hx HLD,  DM, HTN, questionable hx of CAD, low back pain with neurogenic claudication and RLE radiculopathy. The pt had imaging that showed progressive L5-S1 spondy with severe stenosis L2-S1. Failed conservative management Pt is now s/p L3-4 lami, L5-S1 TLIF.     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  # s/p L3-4 lami, L5-S1 TLIF  -Continue pain and bowel regimen as well as drain management as per NSGY     #HTN/HLD  -Home medications: Labetalol 100mg BID, HCTZ 25mg daily, amlodipine 10mg daily and telmisartan 80mg daily   -Pt with low BP this morning with SBP in the 80's after AM labetalol  but asymptomatic   - Amlodipine was d/c'ed on 1/25/25 2/2 low BP and Losartan was d/c'ed this morning 2/2 low BP and increased creatinine   -Will continue labetalol 100mg BID with holding parameters    #Diabetes   -Home medication: metformin 500mg BID   -HgbA1c 6   -Will continue to monitor FS and ISS     #Acute blood anemia   -Likely 2/2 post op loss  - Pt reports that she takes Iron tablets at home 655mg daily   -Will continue to monitor serial CBCs   -Given pt's hx of CAD ( pt denied having knowledge of this CAD hx)  the pt was recommended for transfusion of 1U of PRBCs however the pt is reluctant at this time and the pt would prefer to repeat a CBC this afternoon prior to deciding on whether she will do the transfusion.   -Hold transfusion  -Iron Panel in AM, if low will replete with IV Iron  -Retic Count in AM    #Gastric ulcer  -Continue PPI     #DVT prop  -As per primary team   -Will continue venodyne     #DISPO  -Pt was seen by PT and recommended for RACHEAL

## 2025-01-28 LAB
ANION GAP SERPL CALC-SCNC: 10 MMOL/L — SIGNIFICANT CHANGE UP (ref 5–17)
BUN SERPL-MCNC: 28 MG/DL — HIGH (ref 7–23)
CALCIUM SERPL-MCNC: 8.5 MG/DL — SIGNIFICANT CHANGE UP (ref 8.4–10.5)
CHLORIDE SERPL-SCNC: 106 MMOL/L — SIGNIFICANT CHANGE UP (ref 96–108)
CO2 SERPL-SCNC: 22 MMOL/L — SIGNIFICANT CHANGE UP (ref 22–31)
CREAT SERPL-MCNC: 1.14 MG/DL — SIGNIFICANT CHANGE UP (ref 0.5–1.3)
EGFR: 49 ML/MIN/1.73M2 — LOW
FERRITIN SERPL-MCNC: 122 NG/ML — SIGNIFICANT CHANGE UP (ref 13–330)
GLUCOSE SERPL-MCNC: 129 MG/DL — HIGH (ref 70–99)
HCT VFR BLD CALC: 24.8 % — LOW (ref 34.5–45)
HGB BLD-MCNC: 7.4 G/DL — LOW (ref 11.5–15.5)
IRON SATN MFR SERPL: 21 UG/DL — LOW (ref 30–160)
IRON SATN MFR SERPL: 8 % — LOW (ref 14–50)
MAGNESIUM SERPL-MCNC: 2.1 MG/DL — SIGNIFICANT CHANGE UP (ref 1.6–2.6)
MCHC RBC-ENTMCNC: 25.3 PG — LOW (ref 27–34)
MCHC RBC-ENTMCNC: 29.8 G/DL — LOW (ref 32–36)
MCV RBC AUTO: 84.6 FL — SIGNIFICANT CHANGE UP (ref 80–100)
NRBC # BLD: 0 /100 WBCS — SIGNIFICANT CHANGE UP (ref 0–0)
NRBC BLD-RTO: 0 /100 WBCS — SIGNIFICANT CHANGE UP (ref 0–0)
PHOSPHATE SERPL-MCNC: 2.5 MG/DL — SIGNIFICANT CHANGE UP (ref 2.5–4.5)
PLATELET # BLD AUTO: 178 K/UL — SIGNIFICANT CHANGE UP (ref 150–400)
POTASSIUM SERPL-MCNC: 4.5 MMOL/L — SIGNIFICANT CHANGE UP (ref 3.5–5.3)
POTASSIUM SERPL-SCNC: 4.5 MMOL/L — SIGNIFICANT CHANGE UP (ref 3.5–5.3)
RBC # BLD: 2.93 M/UL — LOW (ref 3.8–5.2)
RBC # FLD: 14.5 % — SIGNIFICANT CHANGE UP (ref 10.3–14.5)
SODIUM SERPL-SCNC: 138 MMOL/L — SIGNIFICANT CHANGE UP (ref 135–145)
TIBC SERPL-MCNC: 269 UG/DL — SIGNIFICANT CHANGE UP (ref 220–430)
TRANSFERRIN SERPL-MCNC: 218 MG/DL — SIGNIFICANT CHANGE UP (ref 200–360)
UIBC SERPL-MCNC: 248 UG/DL — SIGNIFICANT CHANGE UP (ref 110–370)
WBC # BLD: 4.98 K/UL — SIGNIFICANT CHANGE UP (ref 3.8–10.5)
WBC # FLD AUTO: 4.98 K/UL — SIGNIFICANT CHANGE UP (ref 3.8–10.5)

## 2025-01-28 PROCEDURE — 99233 SBSQ HOSP IP/OBS HIGH 50: CPT

## 2025-01-28 PROCEDURE — 99232 SBSQ HOSP IP/OBS MODERATE 35: CPT | Mod: 24

## 2025-01-28 PROCEDURE — 93970 EXTREMITY STUDY: CPT | Mod: 26

## 2025-01-28 RX ADMIN — PANTOPRAZOLE 40 MILLIGRAM(S): 20 TABLET, DELAYED RELEASE ORAL at 05:42

## 2025-01-28 RX ADMIN — Medication 2: at 22:52

## 2025-01-28 RX ADMIN — ACETAMINOPHEN 1000 MILLIGRAM(S): 160 SUSPENSION ORAL at 06:42

## 2025-01-28 RX ADMIN — ENOXAPARIN SODIUM 40 MILLIGRAM(S): 100 INJECTION SUBCUTANEOUS at 21:35

## 2025-01-28 RX ADMIN — OXYCODONE HYDROCHLORIDE 5 MILLIGRAM(S): 30 TABLET ORAL at 05:41

## 2025-01-28 RX ADMIN — LABETALOL HYDROCHLORIDE 75 MILLIGRAM(S): 300 TABLET, FILM COATED ORAL at 05:42

## 2025-01-28 RX ADMIN — ACETAMINOPHEN 1000 MILLIGRAM(S): 160 SUSPENSION ORAL at 21:35

## 2025-01-28 RX ADMIN — Medication 500 MILLIGRAM(S): at 21:35

## 2025-01-28 RX ADMIN — ACETAMINOPHEN 1000 MILLIGRAM(S): 160 SUSPENSION ORAL at 05:42

## 2025-01-28 RX ADMIN — Medication 500 MILLIGRAM(S): at 05:42

## 2025-01-28 RX ADMIN — POLYETHYLENE GLYCOL 3350 17 GRAM(S): 17 POWDER, FOR SOLUTION ORAL at 05:40

## 2025-01-28 RX ADMIN — OXYCODONE HYDROCHLORIDE 5 MILLIGRAM(S): 30 TABLET ORAL at 06:41

## 2025-01-28 NOTE — PROGRESS NOTE ADULT - ASSESSMENT
79F with PMH of HLD, DM2, HTN, questionable hx of CAD, low back pain with neurogenic claudication and RLE radiculopathy. The pt had imaging that showed progressive L5-S1 spondy with severe stenosis L2-S1. Failed conservative management Pt is now s/p L3-4 lami, L5-S1 TLIF.     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  # s/p L3-4 lami, L5-S1 TLIF  -successful BM after escalation of bowel regimen  - continue current bowel regimen  - rest of plan per primary team    #HTN/HLD  -Home medications: Labetalol 100mg BID, HCTZ 25mg daily, amlodipine 10mg daily and telmisartan 80mg daily   - continue labetalol 100mg BID with holding parameters    #Diabetes   -Home medication: metformin 500mg BID   -HgbA1c 6   -Will continue to monitor FS and ISS     #Acute blood anemia   -Likely 2/2 post op loss  - Pt reports that she takes Iron tablets at home 655mg daily   -continue daily CBCs  - iron panel pending    #Gastric ulcer  -Continue PPI     #DVT prop  -As per primary team   -Will continue venodyne     50 minutes spent on this encounter, including face to face with patient, care coordination and documentation.  Plan discussed with neurosurgery team.

## 2025-01-28 NOTE — PROGRESS NOTE ADULT - SUBJECTIVE AND OBJECTIVE BOX
Feeling well today.  Had a large bowel movement this morning.        OVERNIGHT EVENTS: NAEO      Remaining ROS negative       PHYSICAL EXAM:    General: nad, sitting up in chair  HEENT: NC/AT; MMM  Cardiovascular: +S1/S2, RRR  Respiratory: CTA B/L; no W/R/R  Gastrointestinal: soft, NT/ND; +BSx4  Extremities: WWP;       VITAL SIGNS:  Vital Signs Last 24 Hrs  T(C): 36.4 (28 Jan 2025 09:07), Max: 37 (27 Jan 2025 20:30)  T(F): 97.5 (28 Jan 2025 09:07), Max: 98.6 (27 Jan 2025 20:30)  HR: 69 (28 Jan 2025 09:07) (69 - 77)  BP: 114/60 (28 Jan 2025 09:07) (114/60 - 169/79)  BP(mean): --  RR: 17 (28 Jan 2025 09:07) (17 - 18)  SpO2: 97% (28 Jan 2025 09:07) (92% - 98%)    Parameters below as of 28 Jan 2025 09:07  Patient On (Oxygen Delivery Method): room air      MEDICATIONS:  MEDICATIONS  (STANDING):  acetaminophen     Tablet .. 1000 milliGRAM(s) Oral every 8 hours  enoxaparin Injectable 40 milliGRAM(s) SubCutaneous every 24 hours  insulin lispro (ADMELOG) corrective regimen sliding scale   SubCutaneous Before meals and at bedtime  labetalol 75 milliGRAM(s) Oral every 12 hours  methocarbamol 500 milliGRAM(s) Oral every 8 hours  ondansetron   Disintegrating Tablet 4 milliGRAM(s) Oral every 6 hours  pantoprazole    Tablet 40 milliGRAM(s) Oral before breakfast    MEDICATIONS  (PRN):  bisacodyl 5 milliGRAM(s) Oral every 12 hours PRN Constipation  oxyCODONE    IR 5 milliGRAM(s) Oral every 4 hours PRN Severe Pain (7 - 10)      ALLERGIES:  Allergies    No Known Allergies    Intolerances        LABS:                        7.4    4.98  )-----------( 178      ( 28 Jan 2025 05:30 )             24.8     01-28    138  |  106  |  28[H]  ----------------------------<  129[H]  4.5   |  22  |  1.14    Ca    8.5      28 Jan 2025 05:30  Phos  2.5     01-28  Mg     2.1     01-28        Urinalysis Basic - ( 28 Jan 2025 05:30 )    Color: x / Appearance: x / SG: x / pH: x  Gluc: 129 mg/dL / Ketone: x  / Bili: x / Urobili: x   Blood: x / Protein: x / Nitrite: x   Leuk Esterase: x / RBC: x / WBC x   Sq Epi: x / Non Sq Epi: x / Bacteria: x      CAPILLARY BLOOD GLUCOSE      POCT Blood Glucose.: 143 mg/dL (28 Jan 2025 12:38)      RADIOLOGY & ADDITIONAL TESTS: Reviewed.

## 2025-01-28 NOTE — PROGRESS NOTE ADULT - SUBJECTIVE AND OBJECTIVE BOX
HPI:  79F hx HLD, CAD, DM, HTN, low back pain with neurogenic claudication and RLE radiculopathy. Imaging with progressive L5-S1 spondy with severe stenosis L2-S1. Failed conservative management (extensive PT and multiple rounds of injections including ANNA, trigger points and RFA). (24 Jan 2025 12:39)    S/Overnight events:  JUSTIN overnight.       Hospital Course:   1/24: POD 0 from L3-4 lami, L5-S1 TLIF. 500cc bolus for SBP 90s.   1/25. POD 1. JUSTIN overnight. Worked w PT/OT, rec for AR. Amlodipine dc'd per hospitalist. Losartan held.  1/26: POD2, o/n 500cc bolus NS given for low urine production. 1L NS bolus given for SEAN and hypotension. Hgb 7.4. Pt refused transfusion consent. Pending PM labs. Decr. labetalol to 75mg BID.   1/27: POD3, JUSTIN overnight. Dc'd lyrica due to blurry vision. Hgb 7.5., iron panel ordered for tm AM, Tx to regional.   1/28: POD4, JUSTIN overnight.       Vital Signs Last 24 Hrs  T(C): 37 (27 Jan 2025 20:30), Max: 37 (27 Jan 2025 20:30)  T(F): 98.6 (27 Jan 2025 20:30), Max: 98.6 (27 Jan 2025 20:30)  HR: 77 (27 Jan 2025 20:30) (63 - 77)  BP: 137/71 (27 Jan 2025 20:30) (102/50 - 137/71)  BP(mean): --  RR: 18 (27 Jan 2025 20:30) (18 - 19)  SpO2: 97% (27 Jan 2025 20:30) (94% - 98%)    Parameters below as of 27 Jan 2025 20:30  Patient On (Oxygen Delivery Method): room air        I&O's Detail    26 Jan 2025 07:01  -  27 Jan 2025 07:00  --------------------------------------------------------  IN:    Oral Fluid: 1550 mL    Sodium Chloride 0.9% Bolus: 1000 mL  Total IN: 2550 mL    OUT:    Drain (mL): 85 mL    Drain (mL): 205 mL    Voided (mL): 2025 mL  Total OUT: 2315 mL    Total NET: 235 mL      27 Jan 2025 07:01  -  28 Jan 2025 02:55  --------------------------------------------------------  IN:  Total IN: 0 mL    OUT:    Drain (mL): 15 mL    Drain (mL): 140 mL    Voided (mL): 1800 mL  Total OUT: 1955 mL    Total NET: -1955 mL        I&O's Summary    26 Jan 2025 07:01  -  27 Jan 2025 07:00  --------------------------------------------------------  IN: 2550 mL / OUT: 2315 mL / NET: 235 mL    27 Jan 2025 07:01  -  28 Jan 2025 02:55  --------------------------------------------------------  IN: 0 mL / OUT: 1955 mL / NET: -1955 mL        PHYSICAL EXAM:  General: patient seen laying supine in bed in NAD  Neuro: AAOx3, follows commands, opens eyes spontaneously, speech clear and fluent,  face symmetric,  strength 5/5 b/l upper extremities and lower extremities, sensation intact to light touch throughout  HEENT: PERRL  Neck: supple  Cardiac: RRR, S1S2  Pulmonary: chest rise symmetric  Abdomen: soft, nontender, nondistended  Ext: perfusing well  Skin: warm, dry  Wound: posterior lumbar incision dressing c/d/i, HMV x 2    TUBES/LINES:  [] CVC  [] A-line  [] Lumbar Drain  [] Ventriculostomy  [] Other    DIET:  [] NPO  [x] Mechanical  [] Tube feeds    LABS:    Urinalysis Basic - ( 27 Jan 2025 08:30 )    Color: x / Appearance: x / SG: x / pH: x  Gluc: 138 mg/dL / Ketone: x  / Bili: x / Urobili: x   Blood: x / Protein: x / Nitrite: x   Leuk Esterase: x / RBC: x / WBC x   Sq Epi: x / Non Sq Epi: x / Bacteria: x          CAPILLARY BLOOD GLUCOSE      POCT Blood Glucose.: 133 mg/dL (27 Jan 2025 23:20)  POCT Blood Glucose.: 145 mg/dL (27 Jan 2025 17:29)  POCT Blood Glucose.: 185 mg/dL (27 Jan 2025 11:44)  POCT Blood Glucose.: 143 mg/dL (27 Jan 2025 07:40)      Drug Levels: [] N/A    CSF Analysis: [] N/A      Allergies    No Known Allergies    Intolerances      MEDICATIONS:  Antibiotics:    Neuro:  acetaminophen     Tablet .. 1000 milliGRAM(s) Oral every 8 hours  methocarbamol 500 milliGRAM(s) Oral every 8 hours  ondansetron   Disintegrating Tablet 4 milliGRAM(s) Oral every 6 hours  oxyCODONE    IR 5 milliGRAM(s) Oral every 4 hours PRN    Anticoagulation:  enoxaparin Injectable 40 milliGRAM(s) SubCutaneous every 24 hours    OTHER:  bisacodyl 5 milliGRAM(s) Oral every 12 hours PRN  insulin lispro (ADMELOG) corrective regimen sliding scale   SubCutaneous Before meals and at bedtime  labetalol 75 milliGRAM(s) Oral every 12 hours  pantoprazole    Tablet 40 milliGRAM(s) Oral before breakfast  polyethylene glycol 3350 17 Gram(s) Oral two times a day  senna 2 Tablet(s) Oral at bedtime    IVF:    CULTURES:    RADIOLOGY & ADDITIONAL TESTS:      ASSESSMENT:  79F hx HLD, CAD, DM, HTN, low back pain with neurogenic claudication and RLE radiculopathy. Imaging with progressive L5-S1 spondy with severe stenosis L2-S1. Failed conservative management (extensive PT and multiple rounds of injections including ANNA, trigger points and RFA).  Now s/p L3-4 lami, L5-S1 TLIF (1/24).     NEURO:  - neuro/vitals q4  - pain control w ERAS, dc'd lyrica 1/27 due to blurry vision   - standing x-rays complete   - HMV x2, monitor output    CARDS:  - -160   - hx HTN: labetalol 75mg PO BID (decr from home dose 100mg BID), HOLD home losartan and amlodipine    PULM:  - face mask prn when sleeping, encourage IS   - hx ALIZE    GI:  - CCD  - bowel reg, last BM 1/23  - c/w home protonix    RENAL:  - IVL  - voiding    ENDO:  - ISS  - hx of DM: hold home metformin     HEME:  - DVT ppx w SCDs, holding SQL post op   - post op anemia: pt refusing transfusion   - f/u iron panel    ID:  - afebrile    DISPO: telemetry, full code, PT/OT rec RACHEAL    D/w Dr. Goodwin

## 2025-01-28 NOTE — PROCEDURE NOTE - ADDITIONAL PROCEDURE DETAILS
Pt was informed on steps of procedure, location/patient name/ confirmed. Surgical incision site dressing was removed without difficulty, drain insertion site was visualized. Insertion site and surrounding area was cleaned with chlorhexidine, drain was off suction. HMV drain suture was visualized and removed without difficulty. HMV drain was removed without resistance, and pressure was held to insertion site with gauze. Steri strips were placed spanning the drain insertion site. Pt tolerated procedure well.

## 2025-01-29 ENCOUNTER — TRANSCRIPTION ENCOUNTER (OUTPATIENT)
Age: 80
End: 2025-01-29

## 2025-01-29 DIAGNOSIS — D62 ACUTE POSTHEMORRHAGIC ANEMIA: ICD-10-CM

## 2025-01-29 DIAGNOSIS — I10 ESSENTIAL (PRIMARY) HYPERTENSION: ICD-10-CM

## 2025-01-29 DIAGNOSIS — I25.10 ATHEROSCLEROTIC HEART DISEASE OF NATIVE CORONARY ARTERY WITHOUT ANGINA PECTORIS: ICD-10-CM

## 2025-01-29 DIAGNOSIS — E78.5 HYPERLIPIDEMIA, UNSPECIFIED: ICD-10-CM

## 2025-01-29 DIAGNOSIS — E11.9 TYPE 2 DIABETES MELLITUS WITHOUT COMPLICATIONS: ICD-10-CM

## 2025-01-29 LAB
ANION GAP SERPL CALC-SCNC: 9 MMOL/L — SIGNIFICANT CHANGE UP (ref 5–17)
BLD GP AB SCN SERPL QL: NEGATIVE — SIGNIFICANT CHANGE UP
BUN SERPL-MCNC: 26 MG/DL — HIGH (ref 7–23)
CALCIUM SERPL-MCNC: 8.7 MG/DL — SIGNIFICANT CHANGE UP (ref 8.4–10.5)
CHLORIDE SERPL-SCNC: 106 MMOL/L — SIGNIFICANT CHANGE UP (ref 96–108)
CO2 SERPL-SCNC: 22 MMOL/L — SIGNIFICANT CHANGE UP (ref 22–31)
CREAT SERPL-MCNC: 1.05 MG/DL — SIGNIFICANT CHANGE UP (ref 0.5–1.3)
EGFR: 54 ML/MIN/1.73M2 — LOW
FERRITIN SERPL-MCNC: 108 NG/ML — SIGNIFICANT CHANGE UP (ref 13–330)
GLUCOSE SERPL-MCNC: 125 MG/DL — HIGH (ref 70–99)
HCT VFR BLD CALC: 22.3 % — LOW (ref 34.5–45)
HCT VFR BLD CALC: 27.5 % — LOW (ref 34.5–45)
HGB BLD-MCNC: 7 G/DL — CRITICAL LOW (ref 11.5–15.5)
HGB BLD-MCNC: 8.9 G/DL — LOW (ref 11.5–15.5)
IRON SATN MFR SERPL: 12 % — LOW (ref 14–50)
IRON SATN MFR SERPL: 29 UG/DL — LOW (ref 30–160)
MAGNESIUM SERPL-MCNC: 2.1 MG/DL — SIGNIFICANT CHANGE UP (ref 1.6–2.6)
MCHC RBC-ENTMCNC: 26.4 PG — LOW (ref 27–34)
MCHC RBC-ENTMCNC: 26.7 PG — LOW (ref 27–34)
MCHC RBC-ENTMCNC: 31.4 G/DL — LOW (ref 32–36)
MCHC RBC-ENTMCNC: 32.4 G/DL — SIGNIFICANT CHANGE UP (ref 32–36)
MCV RBC AUTO: 82.6 FL — SIGNIFICANT CHANGE UP (ref 80–100)
MCV RBC AUTO: 84.2 FL — SIGNIFICANT CHANGE UP (ref 80–100)
NRBC # BLD: 0 /100 WBCS — SIGNIFICANT CHANGE UP (ref 0–0)
NRBC # BLD: 0 /100 WBCS — SIGNIFICANT CHANGE UP (ref 0–0)
NRBC BLD-RTO: 0 /100 WBCS — SIGNIFICANT CHANGE UP (ref 0–0)
NRBC BLD-RTO: 0 /100 WBCS — SIGNIFICANT CHANGE UP (ref 0–0)
PHOSPHATE SERPL-MCNC: 2.8 MG/DL — SIGNIFICANT CHANGE UP (ref 2.5–4.5)
PLATELET # BLD AUTO: 212 K/UL — SIGNIFICANT CHANGE UP (ref 150–400)
PLATELET # BLD AUTO: 234 K/UL — SIGNIFICANT CHANGE UP (ref 150–400)
POTASSIUM SERPL-MCNC: 4.5 MMOL/L — SIGNIFICANT CHANGE UP (ref 3.5–5.3)
POTASSIUM SERPL-SCNC: 4.5 MMOL/L — SIGNIFICANT CHANGE UP (ref 3.5–5.3)
RBC # BLD: 2.65 M/UL — LOW (ref 3.8–5.2)
RBC # BLD: 3.33 M/UL — LOW (ref 3.8–5.2)
RBC # FLD: 14.6 % — HIGH (ref 10.3–14.5)
RBC # FLD: 14.9 % — HIGH (ref 10.3–14.5)
RH IG SCN BLD-IMP: POSITIVE — SIGNIFICANT CHANGE UP
SODIUM SERPL-SCNC: 137 MMOL/L — SIGNIFICANT CHANGE UP (ref 135–145)
TIBC SERPL-MCNC: 251 UG/DL — SIGNIFICANT CHANGE UP (ref 220–430)
UIBC SERPL-MCNC: 222 UG/DL — SIGNIFICANT CHANGE UP (ref 110–370)
WBC # BLD: 4.99 K/UL — SIGNIFICANT CHANGE UP (ref 3.8–10.5)
WBC # BLD: 5.3 K/UL — SIGNIFICANT CHANGE UP (ref 3.8–10.5)
WBC # FLD AUTO: 4.99 K/UL — SIGNIFICANT CHANGE UP (ref 3.8–10.5)
WBC # FLD AUTO: 5.3 K/UL — SIGNIFICANT CHANGE UP (ref 3.8–10.5)

## 2025-01-29 PROCEDURE — 99233 SBSQ HOSP IP/OBS HIGH 50: CPT

## 2025-01-29 RX ORDER — IRON SUCROSE 20 MG/ML
400 INJECTION, SOLUTION INTRAVENOUS ONCE
Refills: 0 | Status: DISCONTINUED | OUTPATIENT
Start: 2025-01-30 | End: 2025-01-30

## 2025-01-29 RX ORDER — IRON SUCROSE 20 MG/ML
400 INJECTION, SOLUTION INTRAVENOUS ONCE
Refills: 0 | Status: COMPLETED | OUTPATIENT
Start: 2025-01-29 | End: 2025-01-29

## 2025-01-29 RX ADMIN — Medication 500 MILLIGRAM(S): at 06:28

## 2025-01-29 RX ADMIN — LABETALOL HYDROCHLORIDE 75 MILLIGRAM(S): 300 TABLET, FILM COATED ORAL at 18:08

## 2025-01-29 RX ADMIN — Medication 2: at 12:42

## 2025-01-29 RX ADMIN — ACETAMINOPHEN 1000 MILLIGRAM(S): 160 SUSPENSION ORAL at 06:28

## 2025-01-29 RX ADMIN — ACETAMINOPHEN 1000 MILLIGRAM(S): 160 SUSPENSION ORAL at 14:28

## 2025-01-29 RX ADMIN — LABETALOL HYDROCHLORIDE 75 MILLIGRAM(S): 300 TABLET, FILM COATED ORAL at 06:28

## 2025-01-29 RX ADMIN — IRON SUCROSE 108 MILLIGRAM(S): 20 INJECTION, SOLUTION INTRAVENOUS at 14:28

## 2025-01-29 RX ADMIN — Medication 500 MILLIGRAM(S): at 14:28

## 2025-01-29 RX ADMIN — Medication 2: at 17:53

## 2025-01-29 RX ADMIN — ENOXAPARIN SODIUM 40 MILLIGRAM(S): 100 INJECTION SUBCUTANEOUS at 21:31

## 2025-01-29 RX ADMIN — PANTOPRAZOLE 40 MILLIGRAM(S): 20 TABLET, DELAYED RELEASE ORAL at 06:28

## 2025-01-29 RX ADMIN — Medication 500 MILLIGRAM(S): at 21:31

## 2025-01-29 RX ADMIN — ACETAMINOPHEN 1000 MILLIGRAM(S): 160 SUSPENSION ORAL at 21:31

## 2025-01-29 NOTE — DISCHARGE NOTE PROVIDER - NSDCFUADDAPPT_GEN_ALL_CORE_FT
Please call [phone] to schedule a follow up appointment with Dr. Goodwin    If you need to see a pain management doctor you can schedule an appointment with Dr. Martinez at [phone]    Please follow up with your primary care doctor

## 2025-01-29 NOTE — DIETITIAN INITIAL EVALUATION ADULT - ADD RECOMMEND
1. Continue with current diet order (consistent carbohydrate diet)  2. Encourage pt to meet nutritional needs as able  3. Monitor PO intakes, trend weights (weekly), monitor skin integrity, monitor labs (electrolytes, CMP), monitor GI function  4. Encourage adherence to diet education (reinforce as able)   5. Continue insulin regimen prn to promote euglycemia  6. Pain and bowel regimen per team  7. Will continue to assess/honor preferences as able   8. Align nutrition interventions with goals of care at all times

## 2025-01-29 NOTE — DIETITIAN INITIAL EVALUATION ADULT - OTHER CALCULATIONS
Pt is within ~% ideal body weight, thus actual body weight used for all calculations. Needs adjusted for advanced age and postoperative status.

## 2025-01-29 NOTE — DIETITIAN INITIAL EVALUATION ADULT - PERTINENT MEDS FT
MEDICATIONS  (STANDING):  acetaminophen     Tablet .. 1000 milliGRAM(s) Oral every 8 hours  enoxaparin Injectable 40 milliGRAM(s) SubCutaneous every 24 hours  insulin lispro (ADMELOG) corrective regimen sliding scale   SubCutaneous Before meals and at bedtime  labetalol 75 milliGRAM(s) Oral every 12 hours  methocarbamol 500 milliGRAM(s) Oral every 8 hours  ondansetron   Disintegrating Tablet 4 milliGRAM(s) Oral every 6 hours  pantoprazole    Tablet 40 milliGRAM(s) Oral before breakfast    MEDICATIONS  (PRN):  bisacodyl 5 milliGRAM(s) Oral every 12 hours PRN Constipation  oxyCODONE    IR 5 milliGRAM(s) Oral every 4 hours PRN Severe Pain (7 - 10)

## 2025-01-29 NOTE — DIETITIAN INITIAL EVALUATION ADULT - NUTRITIONGOAL OUTCOME1
Pt to consistently meet at least 75% of estimated energy expenditure via tolerated route that is consistent with goals of care during hospital stay

## 2025-01-29 NOTE — DIETITIAN INITIAL EVALUATION ADULT - OTHER INFO
This is a 79 year old female with a history significant for HLD, CAD, DM, HTN, low back pain with neurogenic claudication and RLE radiculopathy. Imaging with progressive L5-S1 spondy with severe stenosis L2-S1. Failed conservative management (extensive PT and multiple rounds of injections including ANNA, trigger points and RFA).    Pt seen in room for nutrition assessment. Pt was unavailable when RD attempted to speak to pt; RD to follow up to obtain additional information. Pt is currently on regular diet, tolerating well, noted with fair PO intakes overall. No cultural, Congregation, or ethnic food preferences obtained, RD to follow up. No documented food allergies. No supplements (micronutrient, oral nutrition supplements) at home noted. Unable to obtain history/information regarding wt changes, reports wt stability at current wt. Dosing wt: ~196 pounds, Ideal body weight: 100 pounds, pt is ~196% of ideal body weight. No noted nausea, vomiting, diarrhea, constipation, last BM on 1/29/25; no noted abdominal distention. No edema. Skin: surgical incisions to the back, left HVAC. Elias: 20. No issues chewing or swallowing noted. No noted discomfort/pain. Labs reviewed: BUN elevated (26), POCT glucose (190), low eGFR (54), medical team is aware; RD to continue to monitor trends. Nutritionally pertinent medications/supplements: insulin, iron, ducolax, zofran, protonix. Observed pt with no overt signs of muscle or fat wasting. Based on ASPEN guidelines, pt does not meet criteria for malnutrition at this time. Education deferred at this time related to pt not able to speak to dietitian at this time. RD will continue to follow. Additional nutrition recommendations below to follow.

## 2025-01-29 NOTE — DISCHARGE NOTE PROVIDER - NSDCFUSCHEDAPPT_GEN_ALL_CORE_FT
Milady Goodwin Physician [facility]  NEUROSURG [address]  Scheduled Appointment: 02/12/2025    Milady Goodwin Physician [facility]  NEUROSURG [address]  Scheduled Appointment: 03/20/2025

## 2025-01-29 NOTE — DISCHARGE NOTE PROVIDER - CARE PROVIDERS DIRECT ADDRESSES
[email or web address]

## 2025-01-29 NOTE — DISCHARGE NOTE PROVIDER - HOSPITAL COURSE
HPI:  79F hx HLD, CAD, DM, HTN, low back pain with neurogenic claudication and RLE radiculopathy. Imaging with progressive L5-S1 spondy with severe stenosis L2-S1. Failed conservative management (extensive PT and multiple rounds of injections including ANNA, trigger points and RFA).     Hospital Course:  1/24: POD 0 from L3-4 lami, L5-S1 TLIF. 500cc bolus for SBP 90s.   1/25. POD 1. JUSTIN overnight. Worked w PT/OT, rec for AR. Amlodipine dc'd per hospitalist. Losartan held.  1/26: POD2, o/n 500cc bolus NS given for low urine production. 1L NS bolus given for SEAN and hypotension. Hgb 7.4. Pt refused transfusion consent. Pending PM labs. Decr. labetalol to 75mg BID.   1/27: POD3, JUSTIN overnight. Dc'd lyrica due to blurry vision. Hgb 7.5., iron panel ordered for tm AM, Tx to regional.   1/28: POD4, JUSTIN overnight. Bowel regimen increased. Had BM. R HMV drain removed. Held bowel regimen for diarrhea. Dopplers neg for DVT.  1/29: POD5, JUSTIN overnight. 1u PRBC given for Hb 7.0. 400mg IV iron daily x 2 days.     Patient evaluated by PT/OT who recommended: AR  Patient is going home? rehab? hospice? Facility Name:     Hospital course c/b: anemia, given 1u pRBC transfusion    Exam on day of discharge:  GEN: laying in bed, NAD  NEURO: AOx3. FC, OE spont, speech intact, face symmetric. CNII-XII intact. PERRL, EOMI. No pronator drift. MAEx4. 5/5 strength throughout. SILT  CV: RRR +S1/S2  PULM: CTAB  GI: Abd soft, NT/ND  EXT: ext warm, dry, nontender  WOUND: lumbar incision c/d/i    Checklist:   - Obtained follow up appointment from NP  - Reviewed final recommendations of inpatient consults  - review discharge planning on provider handoff  - post op imaging completed  - Neurologically stable for discharge  - Vitals stable for discharge   - Afebrile for discharge  - WBC is stable  - Sodium level is normal  - Pain is adequately controlled  - Pt has PICC/walker/brace/collar   - LACE score (10 or > needs PCP apt)   - Needs PCP Follow up?     Given the ongoing treatment plan, please note that the patient has a high potential for further admissions to deliver ongoing treatment and/or procedures as part of the normal course of neurosurgical care    HPI:  79F hx HLD, CAD, DM, HTN, low back pain with neurogenic claudication and RLE radiculopathy. Imaging with progressive L5-S1 spondy with severe stenosis L2-S1. Failed conservative management (extensive PT and multiple rounds of injections including ANNA, trigger points and RFA).     Hospital Course:  1/24: POD 0 from L3-4 lami, L5-S1 TLIF. 500cc bolus for SBP 90s.   1/25. POD 1. JUSTIN overnight. Worked w PT/OT, rec for AR. Amlodipine dc'd per hospitalist. Losartan held.  1/26: POD2, o/n 500cc bolus NS given for low urine production. 1L NS bolus given for SEAN and hypotension. Hgb 7.4. Pt refused transfusion consent. Pending PM labs. Decr. labetalol to 75mg BID.   1/27: POD3, JUSTIN overnight. Dc'd lyrica due to blurry vision. Hgb 7.5., iron panel ordered for tm AM, Tx to regional.   1/28: POD4, JUSTIN overnight. Bowel regimen increased. Had BM. R HMV drain removed. Held bowel regimen for diarrhea. Dopplers neg for DVT.  1/29: POD5, JUSTIN overnight. 1u PRBC given for Hb 7.0. 400mg IV iron daily x 2 days.   1/30: POD6, JUSTIN overnight, neuro satble.     Patient evaluated by PT/OT who recommended: for RACHEAL, but patient wanted acute rehab and has an assistance.   Patient is going to [facility]     Hospital course c/b: anemia, given 1u pRBC transfusion    Exam on day of discharge:  GEN: laying in bed, NAD  NEURO: AOx3. FC, OE spont, speech intact, face symmetric. CNII-XII intact. PERRL, EOMI. No pronator drift. MAEx4. 5/5 strength throughout. SILT  CV: RRR +S1/S2  PULM: CTAB  GI: Abd soft, NT/ND  EXT: ext warm, dry, nontender  WOUND: lumbar incision c/d/i    Patient is neuro stable, vitals stable, medically ready for rehab.     Given the ongoing treatment plan, please note that the patient has a high potential for further admissions to deliver ongoing treatment and/or procedures as part of the normal course of neurosurgical care

## 2025-01-29 NOTE — PROGRESS NOTE ADULT - ASSESSMENT
79F with PMH of HLD, DM2, HTN, questionable hx of CAD, low back pain with neurogenic claudication and RLE radiculopathy. The pt had imaging that showed progressive L5-S1 spondy with severe stenosis L2-S1. Failed conservative management Pt is now s/p L3-4 lami, L5-S1 TLIF.     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  # s/p L3-4 lami, L5-S1 TLIF  -successful BM after escalation of bowel regimen  - continue current bowel regimen  - rest of plan per primary team    #HTN/HLD  -Home medications: Labetalol 100mg BID, HCTZ 25mg daily, amlodipine 10mg daily and telmisartan 80mg daily   - continue labetalol 100mg BID with holding parameters    #Diabetes   -Home medication: metformin 500mg BID   -HgbA1c 6   -Will continue to monitor FS and ISS     #Acute blood anemia   -Likely 2/2 post op loss  - Pt reports that she takes Iron tablets at home 655mg daily   -continue daily CBCs  - iron panel shows LORRIE but also AOCD, will replete with IV Iron but will need to get over acute inflammation to mobilize to produce RBCs  -giving 1u pRBC today    #Gastric ulcer  -Continue PPI     #DVT prop  -As per primary team   -Will continue venodyne     50 minutes spent on this encounter, including face to face with patient, care coordination and documentation.  Plan discussed with neurosurgery team.

## 2025-01-29 NOTE — DIETITIAN INITIAL EVALUATION ADULT - PERTINENT LABORATORY DATA
01-29    137  |  106  |  26[H]  ----------------------------<  125[H]  4.5   |  22  |  1.05    Ca    8.7      29 Jan 2025 06:48  Phos  2.8     01-29  Mg     2.1     01-29    POCT Blood Glucose.: 175 mg/dL (01-29-25 @ 12:14)  A1C with Estimated Average Glucose Result: 6.0 % (01-25-25 @ 05:42)

## 2025-01-29 NOTE — DISCHARGE NOTE PROVIDER - NSDCCPTREATMENT_GEN_ALL_CORE_FT
PRINCIPAL PROCEDURE  Procedure: Lumbar laminectomy  Findings and Treatment: L3-4      SECONDARY PROCEDURE  Procedure: Fusion of posterior lumbar spine  Findings and Treatment: L5-S1

## 2025-01-29 NOTE — DISCHARGE NOTE PROVIDER - NSDCFUADDINST_GEN_ALL_CORE_FT
Neurosurgery follow up appointment date/time:  - are staples/sutures in place?  - what day should staples/sutures be removed (POD 10-14)?  - please call the office to confirm appointment: [phone]    Wound Care:  - can patient shower?  - does dressing need to be changed/removed?  - no picking at incision    Devices:  - Use a rolling walker when ambulating    Activity:  - fatigue is common after surgery, rest if you feel tired   - no bending, lifting, twisting or heavy lifting   - walking is recommended, ambulate as tolerated  - you may shower when you get home, keep your incision dry  - no soaking in a tub/pool/hot tub   - no driving within 24 hours of anesthesia or while taking prescription pain medications   - keep hydrated, drink plenty of water      Inpatient consults:  - Pain management     Please also follow up with your primary care doctor    Pain Expectations:  - pain after surgery is expected  - please take pain meds as prescribed     Medications:  - Your losartan dose was decreased to 75mg twice a day   - STOP taking your norvasc and losartan. Your primary care doctor will inform you when/if you can resume these.  - You can continue your home protonix 40mg daily  - You can take tylenol 1g every 8 hours as needed for mild pain. You can take oxycodone 5mg every 4 hours as needed for severe pain.  - You can take robaxin 500mg every 8 hours as needed for muscle spasm   - pain medications can cause constipation, you should eat a high fiber diet and may take a stool softener while on pain meds   - Avoid taking Advil (ibuprofen), Motrin (naproxen), or Aspirin for pain as they can cause bleeding     Call the office or come to ED if:  - wound has drainage or bleeding, increased redness or pain at incision site, neurological change, fever (>101), chills, night sweats, syncope, nausea/vomiting, chest pain, shortness of breath      Playback:  Please see [facility] for a copy of your discharge instructions    WITHIN 24 HOURS OF DISCHARGE, PLEASE CONTACT NEURO PA  WITH ANY QUESTIONS OR CONCERNS: [phone]   OTHERWISE, PLEASE CALL THE OFFICE WITH ANY QUESTIONS OR CONCERNS: [phone] Neurosurgery follow up appointment date/time:  - You have absorbable sutures in place. There are steri-strips in place from drain removal that will fall off on own.   - please call the office to confirm appointment: [phone]    Wound Care:  - You can shower tomorrow.   - Please leave incision opened to air. Steri-strips will fall off on own. Let warm soapy water run down incision when showering and pat incision dry with clean towel. No lotions to the area.   - When steri-strips fall off on own, leave them off.   - no picking at incision    Devices:  - Use a rolling walker when ambulating    Activity:  - fatigue is common after surgery, rest if you feel tired   - no bending, lifting, twisting or heavy lifting   - walking is recommended, ambulate as tolerated  - you may shower when you get home, keep your incision dry  - no soaking in a tub/pool/hot tub   - no driving within 24 hours of anesthesia or while taking prescription pain medications   - keep hydrated, drink plenty of water      Inpatient consults:  - Pain management     Please also follow up with your primary care doctor    Pain Expectations:  - pain after surgery is expected  - please take pain meds as prescribed     Medications:  - Your labetalol dose was decreased to 75mg twice a day   - STOP taking your norvasc and losartan. Your primary care doctor will inform you when/if you can resume these.  - You can continue your home protonix 40mg daily  - You can take tylenol 1g every 8 hours as needed for mild pain. You can take oxycodone 5mg every 4 hours as needed for severe pain.  - You can take robaxin 500mg every 8 hours as needed for muscle spasm   - Please resume metformin 500mg every 12 hours for diabetes   - Please resume hydrochlorothiazide 25mg daily for HTN  - pain medications can cause constipation, you should eat a high fiber diet and may take a stool softener while on pain meds   - Avoid taking Advil (ibuprofen), Motrin (naproxen), or Aspirin for pain as they can cause bleeding     Call the office or come to ED if:  - wound has drainage or bleeding, increased redness or pain at incision site, neurological change, fever (>101), chills, night sweats, syncope, nausea/vomiting, chest pain, shortness of breath      Playback:  Please see [facility] for a copy of your discharge instructions    WITHIN 24 HOURS OF DISCHARGE, PLEASE CONTACT NEURO PA  WITH ANY QUESTIONS OR CONCERNS: [phone]   OTHERWISE, PLEASE CALL THE OFFICE WITH ANY QUESTIONS OR CONCERNS: [phone]

## 2025-01-29 NOTE — PROGRESS NOTE ADULT - SUBJECTIVE AND OBJECTIVE BOX
O/N Events: JUSTIN  Subjective/ROS: No complaints. Denies HA, CP, SOB, n/v, changes in bowel/urinary habits.  12pt ROS otherwise negative.    VITALS  Vital Signs Last 24 Hrs  T(C): 37 (29 Jan 2025 09:12), Max: 37.3 (28 Jan 2025 20:17)  T(F): 98.6 (29 Jan 2025 09:12), Max: 99.2 (28 Jan 2025 20:17)  HR: 70 (29 Jan 2025 09:12) (70 - 99)  BP: 131/67 (29 Jan 2025 09:12) (115/68 - 150/73)  BP(mean): --  RR: 17 (29 Jan 2025 09:12) (16 - 17)  SpO2: 95% (29 Jan 2025 09:12) (95% - 99%)    Parameters below as of 29 Jan 2025 09:12  Patient On (Oxygen Delivery Method): room air        I&O's Summary    28 Jan 2025 07:01  -  29 Jan 2025 07:00  --------------------------------------------------------  IN: 480 mL / OUT: 170 mL / NET: 310 mL        CAPILLARY BLOOD GLUCOSE  POCT Blood Glucose.: 137 mg/dL (29 Jan 2025 06:36)  POCT Blood Glucose.: 190 mg/dL (28 Jan 2025 22:05)  POCT Blood Glucose.: 134 mg/dL (28 Jan 2025 17:54)  POCT Blood Glucose.: 143 mg/dL (28 Jan 2025 12:38)      PHYSICAL EXAM  Gen: NAD sitting up in bed  CV: RRR, +S1/S2, no mumur  Pulm: CTA b/l no wheezing or crackles   Abd: soft, NTND + BS no rebound or guarding + HMV drain in place   Neuro: AAOX3; nonfocal     MEDICATIONS  (STANDING):  acetaminophen     Tablet .. 1000 milliGRAM(s) Oral every 8 hours  enoxaparin Injectable 40 milliGRAM(s) SubCutaneous every 24 hours  insulin lispro (ADMELOG) corrective regimen sliding scale   SubCutaneous Before meals and at bedtime  iron sucrose IVPB 400 milliGRAM(s) IV Intermittent once  labetalol 75 milliGRAM(s) Oral every 12 hours  methocarbamol 500 milliGRAM(s) Oral every 8 hours  ondansetron   Disintegrating Tablet 4 milliGRAM(s) Oral every 6 hours  pantoprazole    Tablet 40 milliGRAM(s) Oral before breakfast    MEDICATIONS  (PRN):  bisacodyl 5 milliGRAM(s) Oral every 12 hours PRN Constipation  oxyCODONE    IR 5 milliGRAM(s) Oral every 4 hours PRN Severe Pain (7 - 10)      No Known Allergies      LABS                        7.0    4.99  )-----------( 212      ( 29 Jan 2025 06:48 )             22.3     01-29    137  |  106  |  26[H]  ----------------------------<  125[H]  4.5   |  22  |  1.05    Ca    8.7      29 Jan 2025 06:48  Phos  2.8     01-29  Mg     2.1     01-29        Urinalysis Basic - ( 29 Jan 2025 06:48 )    Color: x / Appearance: x / SG: x / pH: x  Gluc: 125 mg/dL / Ketone: x  / Bili: x / Urobili: x   Blood: x / Protein: x / Nitrite: x   Leuk Esterase: x / RBC: x / WBC x   Sq Epi: x / Non Sq Epi: x / Bacteria: x            IMAGING/EKG/ETC: reviewed

## 2025-01-29 NOTE — DISCHARGE NOTE PROVIDER - NSDCCPCAREPLAN_GEN_ALL_CORE_FT
PRINCIPAL DISCHARGE DIAGNOSIS  Diagnosis: Lumbar stenosis  Assessment and Plan of Treatment: You underwent an L3-4 laminectomy, L5-S1 TLIF on 1/24  Follow up with Dr. Goodwin outpatient      SECONDARY DISCHARGE DIAGNOSES  Diagnosis: Anemia due to acute blood loss  Assessment and Plan of Treatment: You received a blood transfusion (1 unit) on 1/29. You also received 2 doses of IV iron    Diagnosis: DM (diabetes mellitus)  Assessment and Plan of Treatment: You can resume your metformin    Diagnosis: HTN (hypertension)  Assessment and Plan of Treatment: - Your labetalol was decreased to 75mg twice daily  - STOP taking your losartan and amlodipine. Your primary care doctor can resume these when appropriate outpatient     PRINCIPAL DISCHARGE DIAGNOSIS  Diagnosis: Lumbar stenosis  Assessment and Plan of Treatment: You underwent an L3-4 laminectomy, L5-S1 TLIF on 1/24  Follow up with Dr. Goodiwn outpatient      SECONDARY DISCHARGE DIAGNOSES  Diagnosis: Anemia due to acute blood loss  Assessment and Plan of Treatment: You received a blood transfusion (1 unit) on 1/29. You also received 2 doses of IV iron    Diagnosis: DM (diabetes mellitus)  Assessment and Plan of Treatment: You can resume your metformin    Diagnosis: HTN (hypertension)  Assessment and Plan of Treatment: - Your labetalol was decreased to 75mg twice daily  - STOP taking your losartan and amlodipine. Your primary care doctor can resume these when appropriate outpatient  - Resume hydrochlorothiazide

## 2025-01-29 NOTE — PROGRESS NOTE ADULT - TIME BILLING
Review of hospital course, labs, vitals, medical records.   Bedside exam and interview   Discussed plan of care with primary team ACP and house staff   Documenting the encounter
Review of hospital course, labs, vitals, medical records.   Bedside exam and interview   Discussed plan of care with primary team ACP and house staff   Documenting the encounter

## 2025-01-29 NOTE — DISCHARGE NOTE PROVIDER - NSDCMRMEDTOKEN_GEN_ALL_CORE_FT
amLODIPine 10 mg oral tablet: 1 tab(s) orally once a day  ferrous sulfate: once a day 325mg  hydroCHLOROthiazide 25 mg oral tablet: 1 tab(s) orally once a day  Imodium 2 mg oral capsule: 1 cap(s) orally prn  labetalol 100 mg oral tablet: 1 tab(s) orally 2 times a day  metFORMIN 500 mg oral tablet: 1 tab(s) orally 2 times a day  pantoprazole 40 mg oral delayed release tablet: 1 tab(s) orally once a day  telmisartan 80 mg oral tablet: 1 tab(s) orally once a day   acetaminophen 500 mg oral tablet: 2 tab(s) orally every 8 hours as needed for  mild pain  bisacodyl 5 mg oral delayed release tablet: 1 tab(s) orally every 12 hours As needed Constipation  enoxaparin: 40 milligram(s) intramuscular once a day (at bedtime) For DVT prophylaxis while patient is in rehab.  ferrous sulfate: once a day 325mg  hydroCHLOROthiazide 25 mg oral tablet: 1 tab(s) orally once a day  Imodium 2 mg oral capsule: 1 cap(s) orally prn  labetalol: 75 milligram(s) orally every 12 hours  metFORMIN 500 mg oral tablet: 1 tab(s) orally 2 times a day  methocarbamol 500 mg oral tablet: 1 tab(s) orally every 8 hours  oxyCODONE 5 mg oral tablet: 1 tab(s) orally every 6 hours as needed for Severe Pain (7 - 10)  pantoprazole 40 mg oral delayed release tablet: 1 tab(s) orally once a day

## 2025-01-29 NOTE — PROGRESS NOTE ADULT - SUBJECTIVE AND OBJECTIVE BOX
HPI:  79F hx HLD, CAD, DM, HTN, low back pain with neurogenic claudication and RLE radiculopathy. Imaging with progressive L5-S1 spondy with severe stenosis L2-S1. Failed conservative management (extensive PT and multiple rounds of injections including ANNA, trigger points and RFA). (24 Jan 2025 12:39)    S/Overnight events:  JUSTIN overnight.       Hospital Course:   1/24: POD 0 from L3-4 lami, L5-S1 TLIF. 500cc bolus for SBP 90s.   1/25. POD 1. JUSTIN overnight. Worked w PT/OT, rec for AR. Amlodipine dc'd per hospitalist. Losartan held.  1/26: POD2, o/n 500cc bolus NS given for low urine production. 1L NS bolus given for SEAN and hypotension. Hgb 7.4. Pt refused transfusion consent. Pending PM labs. Decr. labetalol to 75mg BID.   1/27: POD3, JUSTIN overnight. Dc'd lyrica due to blurry vision. Hgb 7.5., iron panel ordered for tm AM, Tx to regional.   1/28: POD4, JUSTIN overnight. Bowel regimen increased. Had BM. R HMV drain removed. Held bowel regimen for diarrhea. Dopplers neg for DVT.  1/29: POD5, JUSTIN overnight.       Vital Signs Last 24 Hrs  T(C): 37.3 (28 Jan 2025 20:17), Max: 37.3 (28 Jan 2025 20:17)  T(F): 99.2 (28 Jan 2025 20:17), Max: 99.2 (28 Jan 2025 20:17)  HR: 99 (28 Jan 2025 20:17) (69 - 99)  BP: 150/73 (28 Jan 2025 20:17) (114/60 - 169/79)  BP(mean): --  RR: 17 (28 Jan 2025 20:17) (17 - 18)  SpO2: 95% (28 Jan 2025 20:17) (92% - 99%)    Parameters below as of 28 Jan 2025 20:17  Patient On (Oxygen Delivery Method): room air        I&O's Detail    27 Jan 2025 07:01  -  28 Jan 2025 07:00  --------------------------------------------------------  IN:  Total IN: 0 mL    OUT:    Drain (mL): 18 mL    Drain (mL): 190 mL    Voided (mL): 1800 mL  Total OUT: 2008 mL    Total NET: -2008 mL      28 Jan 2025 07:01  -  29 Jan 2025 00:17  --------------------------------------------------------  IN:    Oral Fluid: 480 mL  Total IN: 480 mL    OUT:    Drain (mL): 120 mL  Total OUT: 120 mL    Total NET: 360 mL        I&O's Summary    27 Jan 2025 07:01  -  28 Jan 2025 07:00  --------------------------------------------------------  IN: 0 mL / OUT: 2008 mL / NET: -2008 mL    28 Jan 2025 07:01  -  29 Jan 2025 00:17  --------------------------------------------------------  IN: 480 mL / OUT: 120 mL / NET: 360 mL        PHYSICAL EXAM:  General: patient seen laying supine in bed in NAD  Neuro: AAOx3, follows commands, opens eyes spontaneously, speech clear and fluent,  face symmetric,  strength 5/5 b/l upper extremities and lower extremities, sensation intact to light touch throughout  HEENT: PERRL  Neck: supple  Cardiac: RRR, S1S2  Pulmonary: chest rise symmetric  Abdomen: soft, nontender, nondistended  Ext: perfusing well  Skin: warm, dry  Wound: posterior lumbar incision dressing c/d/i, HMV x 1    TUBES/LINES:  [] CVC  [] A-line  [] Lumbar Drain  [] Ventriculostomy  [] Other    DIET:  [] NPO  [x] Mechanical  [] Tube feeds    LABS:    Urinalysis Basic - ( 28 Jan 2025 05:30 )    Color: x / Appearance: x / SG: x / pH: x  Gluc: 129 mg/dL / Ketone: x  / Bili: x / Urobili: x   Blood: x / Protein: x / Nitrite: x   Leuk Esterase: x / RBC: x / WBC x   Sq Epi: x / Non Sq Epi: x / Bacteria: x          CAPILLARY BLOOD GLUCOSE      POCT Blood Glucose.: 190 mg/dL (28 Jan 2025 22:05)  POCT Blood Glucose.: 134 mg/dL (28 Jan 2025 17:54)  POCT Blood Glucose.: 143 mg/dL (28 Jan 2025 12:38)  POCT Blood Glucose.: 136 mg/dL (28 Jan 2025 07:25)      Drug Levels: [] N/A    CSF Analysis: [] N/A      Allergies    No Known Allergies    Intolerances      MEDICATIONS:  Antibiotics:    Neuro:  acetaminophen     Tablet .. 1000 milliGRAM(s) Oral every 8 hours  methocarbamol 500 milliGRAM(s) Oral every 8 hours  ondansetron   Disintegrating Tablet 4 milliGRAM(s) Oral every 6 hours  oxyCODONE    IR 5 milliGRAM(s) Oral every 4 hours PRN    Anticoagulation:  enoxaparin Injectable 40 milliGRAM(s) SubCutaneous every 24 hours    OTHER:  bisacodyl 5 milliGRAM(s) Oral every 12 hours PRN  insulin lispro (ADMELOG) corrective regimen sliding scale   SubCutaneous Before meals and at bedtime  labetalol 75 milliGRAM(s) Oral every 12 hours  pantoprazole    Tablet 40 milliGRAM(s) Oral before breakfast    IVF:    CULTURES:    RADIOLOGY & ADDITIONAL TESTS:      ASSESSMENT:  79F hx HLD, CAD, DM, HTN, low back pain with neurogenic claudication and RLE radiculopathy. Imaging with progressive L5-S1 spondy with severe stenosis L2-S1. Failed conservative management (extensive PT and multiple rounds of injections including ANNA, trigger points and RFA).  Now s/p L3-4 lami, L5-S1 TLIF (1/24).     NEURO:  - neuro/vitals q4  - pain control w ERAS, dc'd lyrica 1/27 due to blurry vision   - standing x-rays complete   - HMV x1, monitor output, s/p x1 HMV 1/28    CARDS:  - -160   - hx HTN: labetalol 75mg PO BID (decr from home dose 100mg BID), HOLD home losartan and amlodipine    PULM:  - face mask prn when sleeping, encourage IS   - hx ALIZE    GI:  - CCD  - bowel reg held for diarrhea, last BM 1/28  - c/w home protonix    RENAL:  - IVL  - voiding    ENDO:  - ISS  - hx of DM: hold home metformin     HEME:  - DVT ppx w SCDs, holding SQL post op   - post op anemia: pt refusing transfusion   - f/u iron panel  - LE dopplers 1/28 negative     ID:  - afebrile    DISPO: telemetry, full code, PT/OT rec RACHEAL    D/w Dr. Goodwin

## 2025-01-30 ENCOUNTER — TRANSCRIPTION ENCOUNTER (OUTPATIENT)
Age: 80
End: 2025-01-30

## 2025-01-30 VITALS
OXYGEN SATURATION: 95 % | DIASTOLIC BLOOD PRESSURE: 74 MMHG | SYSTOLIC BLOOD PRESSURE: 165 MMHG | HEART RATE: 67 BPM | TEMPERATURE: 98 F | RESPIRATION RATE: 17 BRPM

## 2025-01-30 LAB
ANION GAP SERPL CALC-SCNC: 14 MMOL/L — SIGNIFICANT CHANGE UP (ref 5–17)
BUN SERPL-MCNC: 21 MG/DL — SIGNIFICANT CHANGE UP (ref 7–23)
CALCIUM SERPL-MCNC: 9.1 MG/DL — SIGNIFICANT CHANGE UP (ref 8.4–10.5)
CHLORIDE SERPL-SCNC: 103 MMOL/L — SIGNIFICANT CHANGE UP (ref 96–108)
CO2 SERPL-SCNC: 22 MMOL/L — SIGNIFICANT CHANGE UP (ref 22–31)
CREAT SERPL-MCNC: 1 MG/DL — SIGNIFICANT CHANGE UP (ref 0.5–1.3)
EGFR: 58 ML/MIN/1.73M2 — LOW
GLUCOSE SERPL-MCNC: 157 MG/DL — HIGH (ref 70–99)
HCT VFR BLD CALC: 27.9 % — LOW (ref 34.5–45)
HGB BLD-MCNC: 8.7 G/DL — LOW (ref 11.5–15.5)
MAGNESIUM SERPL-MCNC: 1.8 MG/DL — SIGNIFICANT CHANGE UP (ref 1.6–2.6)
MCHC RBC-ENTMCNC: 25.4 PG — LOW (ref 27–34)
MCHC RBC-ENTMCNC: 31.2 G/DL — LOW (ref 32–36)
MCV RBC AUTO: 81.6 FL — SIGNIFICANT CHANGE UP (ref 80–100)
NRBC # BLD: 0 /100 WBCS — SIGNIFICANT CHANGE UP (ref 0–0)
NRBC BLD-RTO: 0 /100 WBCS — SIGNIFICANT CHANGE UP (ref 0–0)
PHOSPHATE SERPL-MCNC: 3.2 MG/DL — SIGNIFICANT CHANGE UP (ref 2.5–4.5)
PLATELET # BLD AUTO: 230 K/UL — SIGNIFICANT CHANGE UP (ref 150–400)
POTASSIUM SERPL-MCNC: 4.1 MMOL/L — SIGNIFICANT CHANGE UP (ref 3.5–5.3)
POTASSIUM SERPL-SCNC: 4.1 MMOL/L — SIGNIFICANT CHANGE UP (ref 3.5–5.3)
RBC # BLD: 3.42 M/UL — LOW (ref 3.8–5.2)
RBC # FLD: 14.7 % — HIGH (ref 10.3–14.5)
SODIUM SERPL-SCNC: 139 MMOL/L — SIGNIFICANT CHANGE UP (ref 135–145)
WBC # BLD: 4.9 K/UL — SIGNIFICANT CHANGE UP (ref 3.8–10.5)
WBC # FLD AUTO: 4.9 K/UL — SIGNIFICANT CHANGE UP (ref 3.8–10.5)

## 2025-01-30 PROCEDURE — 36430 TRANSFUSION BLD/BLD COMPNT: CPT

## 2025-01-30 PROCEDURE — 86900 BLOOD TYPING SEROLOGIC ABO: CPT

## 2025-01-30 PROCEDURE — 76000 FLUOROSCOPY <1 HR PHYS/QHP: CPT

## 2025-01-30 PROCEDURE — 83735 ASSAY OF MAGNESIUM: CPT

## 2025-01-30 PROCEDURE — 36415 COLL VENOUS BLD VENIPUNCTURE: CPT

## 2025-01-30 PROCEDURE — 86901 BLOOD TYPING SEROLOGIC RH(D): CPT

## 2025-01-30 PROCEDURE — 97110 THERAPEUTIC EXERCISES: CPT

## 2025-01-30 PROCEDURE — 72100 X-RAY EXAM L-S SPINE 2/3 VWS: CPT

## 2025-01-30 PROCEDURE — 84132 ASSAY OF SERUM POTASSIUM: CPT

## 2025-01-30 PROCEDURE — 86850 RBC ANTIBODY SCREEN: CPT

## 2025-01-30 PROCEDURE — 93970 EXTREMITY STUDY: CPT

## 2025-01-30 PROCEDURE — C1889: CPT

## 2025-01-30 PROCEDURE — P9016: CPT

## 2025-01-30 PROCEDURE — 84295 ASSAY OF SERUM SODIUM: CPT

## 2025-01-30 PROCEDURE — 83540 ASSAY OF IRON: CPT

## 2025-01-30 PROCEDURE — 82330 ASSAY OF CALCIUM: CPT

## 2025-01-30 PROCEDURE — 85025 COMPLETE CBC W/AUTO DIFF WBC: CPT

## 2025-01-30 PROCEDURE — 97535 SELF CARE MNGMENT TRAINING: CPT

## 2025-01-30 PROCEDURE — 83036 HEMOGLOBIN GLYCOSYLATED A1C: CPT

## 2025-01-30 PROCEDURE — 83550 IRON BINDING TEST: CPT

## 2025-01-30 PROCEDURE — 82962 GLUCOSE BLOOD TEST: CPT

## 2025-01-30 PROCEDURE — 97161 PT EVAL LOW COMPLEX 20 MIN: CPT

## 2025-01-30 PROCEDURE — C1713: CPT

## 2025-01-30 PROCEDURE — 84466 ASSAY OF TRANSFERRIN: CPT

## 2025-01-30 PROCEDURE — 82728 ASSAY OF FERRITIN: CPT

## 2025-01-30 PROCEDURE — 86923 COMPATIBILITY TEST ELECTRIC: CPT

## 2025-01-30 PROCEDURE — 97166 OT EVAL MOD COMPLEX 45 MIN: CPT

## 2025-01-30 PROCEDURE — 85027 COMPLETE CBC AUTOMATED: CPT

## 2025-01-30 PROCEDURE — 97116 GAIT TRAINING THERAPY: CPT

## 2025-01-30 PROCEDURE — 80048 BASIC METABOLIC PNL TOTAL CA: CPT

## 2025-01-30 PROCEDURE — 85014 HEMATOCRIT: CPT

## 2025-01-30 PROCEDURE — 82947 ASSAY GLUCOSE BLOOD QUANT: CPT

## 2025-01-30 PROCEDURE — 84100 ASSAY OF PHOSPHORUS: CPT

## 2025-01-30 PROCEDURE — 82803 BLOOD GASES ANY COMBINATION: CPT

## 2025-01-30 RX ORDER — MAGNESIUM SULFATE 0.8 MEQ/ML
2 AMPUL (ML) INJECTION ONCE
Refills: 0 | Status: DISCONTINUED | OUTPATIENT
Start: 2025-01-30 | End: 2025-01-30

## 2025-01-30 RX ORDER — AMLODIPINE BESYLATE 5 MG
1 TABLET ORAL
Refills: 0 | DISCHARGE

## 2025-01-30 RX ORDER — OXYCODONE HYDROCHLORIDE 30 MG/1
1 TABLET ORAL
Qty: 0 | Refills: 0 | DISCHARGE
Start: 2025-01-30

## 2025-01-30 RX ORDER — TELMISARTAN 40 MG/1
1 TABLET ORAL
Refills: 0 | DISCHARGE

## 2025-01-30 RX ORDER — ACETAMINOPHEN 160 MG/5ML
2 SUSPENSION ORAL
Qty: 0 | Refills: 0 | DISCHARGE
Start: 2025-01-30

## 2025-01-30 RX ORDER — ENOXAPARIN SODIUM 100 MG/ML
40 INJECTION SUBCUTANEOUS
Qty: 0 | Refills: 0 | DISCHARGE
Start: 2025-01-30

## 2025-01-30 RX ORDER — METHOCARBAMOL 500 MG
1 TABLET ORAL
Qty: 0 | Refills: 0 | DISCHARGE
Start: 2025-01-30

## 2025-01-30 RX ORDER — LABETALOL HYDROCHLORIDE 300 MG/1
75 TABLET, FILM COATED ORAL
Qty: 0 | Refills: 0 | DISCHARGE
Start: 2025-01-30

## 2025-01-30 RX ORDER — LABETALOL HYDROCHLORIDE 300 MG/1
1 TABLET, FILM COATED ORAL
Refills: 0 | DISCHARGE

## 2025-01-30 RX ORDER — BISACODYL 5 MG
1 TABLET, DELAYED RELEASE (ENTERIC COATED) ORAL
Qty: 0 | Refills: 0 | DISCHARGE
Start: 2025-01-30

## 2025-01-30 RX ADMIN — Medication 500 MILLIGRAM(S): at 13:40

## 2025-01-30 RX ADMIN — PANTOPRAZOLE 40 MILLIGRAM(S): 20 TABLET, DELAYED RELEASE ORAL at 05:56

## 2025-01-30 RX ADMIN — LABETALOL HYDROCHLORIDE 75 MILLIGRAM(S): 300 TABLET, FILM COATED ORAL at 05:56

## 2025-01-30 RX ADMIN — Medication 500 MILLIGRAM(S): at 05:56

## 2025-01-30 RX ADMIN — ACETAMINOPHEN 1000 MILLIGRAM(S): 160 SUSPENSION ORAL at 05:56

## 2025-01-30 RX ADMIN — ACETAMINOPHEN 1000 MILLIGRAM(S): 160 SUSPENSION ORAL at 13:40

## 2025-01-30 NOTE — PROGRESS NOTE ADULT - REASON FOR ADMISSION
L3-4 laminectomy; L5-S1 fusion

## 2025-01-30 NOTE — DISCHARGE NOTE NURSING/CASE MANAGEMENT/SOCIAL WORK - FINANCIAL ASSISTANCE
[facility] provides services at a reduced cost to those who are determined to be eligible through [facility]’s financial assistance program. Information regarding [facility]’s financial assistance program can be found by going to [email or web address] or by calling [phone].

## 2025-01-30 NOTE — DISCHARGE NOTE NURSING/CASE MANAGEMENT/SOCIAL WORK - PATIENT PORTAL LINK FT
You can access the FollowMyHealth Patient Portal offered by [facility] by registering at the following website: [email or web address]. By joining [facility]’s FollowMyHealth portal, you will also be able to view your health information using other applications (apps) compatible with our system.

## 2025-01-30 NOTE — PROGRESS NOTE ADULT - PROVIDER SPECIALTY LIST ADULT
Hospitalist
Neurosurgery
Hospitalist

## 2025-01-30 NOTE — PROGRESS NOTE ADULT - SUBJECTIVE AND OBJECTIVE BOX
HPI:  79F hx HLD, CAD, DM, HTN, low back pain with neurogenic claudication and RLE radiculopathy. Imaging with progressive L5-S1 spondy with severe stenosis L2-S1. Failed conservative management (extensive PT and multiple rounds of injections including ANNA, trigger points and RFA). (24 Jan 2025 12:39)    S/Overnight events:  JUSTIN overnight.        Hospital Course:   1/24: POD 0 from L3-4 lami, L5-S1 TLIF. 500cc bolus for SBP 90s.   1/25. POD 1. JUSTIN overnight. Worked w PT/OT, rec for AR. Amlodipine dc'd per hospitalist. Losartan held.  1/26: POD2, o/n 500cc bolus NS given for low urine production. 1L NS bolus given for SEAN and hypotension. Hgb 7.4. Pt refused transfusion consent. Pending PM labs. Decr. labetalol to 75mg BID.   1/27: POD3, JUSTIN overnight. Dc'd lyrica due to blurry vision. Hgb 7.5., iron panel ordered for tm AM, Tx to regional.   1/28: POD4, JUSTIN overnight. Bowel regimen increased. Had BM. R HMV drain removed. Held bowel regimen for diarrhea. Dopplers neg for DVT.  1/29: POD5, JUSTIN overnight. 1u PRBC given for Hb 7.0. 400mg IV iron daily x 2 days. HMV canister was disconnected from the tubing, HMV replaced.   1/30: POD6, JUSTIN overnight, neuro satble.        Vital Signs Last 24 Hrs  T(C): 36.6 (29 Jan 2025 20:36), Max: 37 (29 Jan 2025 09:12)  T(F): 97.8 (29 Jan 2025 20:36), Max: 98.6 (29 Jan 2025 09:12)  HR: 70 (29 Jan 2025 20:36) (70 - 71)  BP: 167/79 (29 Jan 2025 20:36) (129/59 - 167/79)  BP(mean): --  RR: 17 (29 Jan 2025 20:36) (16 - 17)  SpO2: 97% (29 Jan 2025 20:36) (95% - 97%)    Parameters below as of 29 Jan 2025 15:40  Patient On (Oxygen Delivery Method): room air        I&O's Detail    28 Jan 2025 07:01  -  29 Jan 2025 07:00  --------------------------------------------------------  IN:    Oral Fluid: 480 mL  Total IN: 480 mL    OUT:    Drain (mL): 170 mL  Total OUT: 170 mL    Total NET: 310 mL      29 Jan 2025 07:01  -  30 Jan 2025 00:15  --------------------------------------------------------  IN:    Oral Fluid: 720 mL    PRBCs (Packed Red Blood Cells): 300 mL  Total IN: 1020 mL    OUT:    Drain (mL): 50 mL  Total OUT: 50 mL    Total NET: 970 mL        I&O's Summary    28 Jan 2025 07:01  -  29 Jan 2025 07:00  --------------------------------------------------------  IN: 480 mL / OUT: 170 mL / NET: 310 mL    29 Jan 2025 07:01  -  30 Jan 2025 00:15  --------------------------------------------------------  IN: 1020 mL / OUT: 50 mL / NET: 970 mL        PHYSICAL EXAM:  General: patient seen laying supine in bed in NAD  Neuro: AAOx3, follows commands, opens eyes spontaneously, speech clear and fluent,  face symmetric,  strength 5/5 b/l upper extremities and lower extremities, sensation intact to light touch throughout  HEENT: PERRL  Neck: supple  Cardiac: RRR, S1S2  Pulmonary: chest rise symmetric  Abdomen: soft, nontender, nondistended  Ext: perfusing well  Skin: warm, dry  Wound: posterior lumbar incision dressing c/d/i, HMV x 1    TUBES/LINES:  [] CVC  [] A-line  [] Lumbar Drain  [] Ventriculostomy  [] Other    DIET:  [] NPO  [x] Mechanical  [] Tube feeds    LABS:    Urinalysis Basic - ( 29 Jan 2025 06:48 )    Color: x / Appearance: x / SG: x / pH: x  Gluc: 125 mg/dL / Ketone: x  / Bili: x / Urobili: x   Blood: x / Protein: x / Nitrite: x   Leuk Esterase: x / RBC: x / WBC x   Sq Epi: x / Non Sq Epi: x / Bacteria: x          CAPILLARY BLOOD GLUCOSE      POCT Blood Glucose.: 149 mg/dL (29 Jan 2025 21:59)  POCT Blood Glucose.: 158 mg/dL (29 Jan 2025 17:46)  POCT Blood Glucose.: 175 mg/dL (29 Jan 2025 12:14)  POCT Blood Glucose.: 137 mg/dL (29 Jan 2025 06:36)      Drug Levels: [] N/A    CSF Analysis: [] N/A      Allergies    No Known Allergies    Intolerances      MEDICATIONS:  Antibiotics:    Neuro:  acetaminophen     Tablet .. 1000 milliGRAM(s) Oral every 8 hours  methocarbamol 500 milliGRAM(s) Oral every 8 hours  ondansetron   Disintegrating Tablet 4 milliGRAM(s) Oral every 6 hours  oxyCODONE    IR 5 milliGRAM(s) Oral every 4 hours PRN    Anticoagulation:  enoxaparin Injectable 40 milliGRAM(s) SubCutaneous every 24 hours    OTHER:  bisacodyl 5 milliGRAM(s) Oral every 12 hours PRN  insulin lispro (ADMELOG) corrective regimen sliding scale   SubCutaneous Before meals and at bedtime  labetalol 75 milliGRAM(s) Oral every 12 hours  pantoprazole    Tablet 40 milliGRAM(s) Oral before breakfast    IVF:  iron sucrose IVPB 400 milliGRAM(s) IV Intermittent once    CULTURES:    RADIOLOGY & ADDITIONAL TESTS:      ASSESSMENT:  79F hx HLD, CAD, DM, HTN, low back pain with neurogenic claudication and RLE radiculopathy. Imaging with progressive L5-S1 spondy with severe stenosis L2-S1. Failed conservative management (extensive PT and multiple rounds of injections including ANNA, trigger points and RFA).  Now s/p L3-4 lami, L5-S1 TLIF (1/24).     NEURO:  - neuro/vitals q4  - pain control w ERAS, dc'd lyrica 1/27 due to blurry vision   - standing x-rays complete   - HMV x1, monitor output, s/p x1 HMV 1/28    CARDS:  - -160   - hx HTN: labetalol 75mg PO BID (decr from home dose 100mg BID), HOLD home losartan and amlodipine    PULM:  - face mask prn when sleeping, encourage IS   - hx ALIZE    GI:  - CCD  - bowel reg held for diarrhea, last BM 1/28  - c/w home protonix    RENAL:  - IVL  - voiding    ENDO:  - ISS  - hx of DM: hold home metformin     HEME:  - DVT ppx w SCDs and SQL   - post op anemia: s/p 1u PRBC 1/29  - 400mg IV iron x 2 days (1/29-1/30)  - LE dopplers 1/28 negative     ID:  - afebrile    DISPO: telemetry, full code, PT/OT rec AR    D/w Dr. Goodwin

## 2025-01-30 NOTE — PROCEDURE NOTE - ADDITIONAL PROCEDURE DETAILS
Pt was informed on steps of procedure, location/patient name/ confirmed. Surgical incision site dressing was removed without difficulty, drain insertion site was visualized. Insertion site and surrounding area was cleaned with chlorhexidine, drain was off suction. MEGHNA drain suture was visualized and removed without difficulty. MEGHNA drain was removed without resistance, and pressure was held to insertion site with gauze. Steri strips were placed spanning the drain insertion site. Incision left open to air. Pt tolerated procedure well. Pt was informed on steps of procedure, location/patient name/ confirmed. Surgical incision site dressing was removed without difficulty, drain insertion site was visualized. Insertion site and surrounding area was cleaned with chlorhexidine, drain was off suction. HMV drain suture was visualized and removed without difficulty. HMV drain was removed without resistance, and pressure was held to insertion site with gauze. Steri strips were placed spanning the drain insertion site. Incision left open to air. Pt tolerated procedure well.

## 2025-01-30 NOTE — DISCHARGE NOTE NURSING/CASE MANAGEMENT/SOCIAL WORK - NSDCFUADDAPPT_GEN_ALL_CORE_FT
Please call [phone] to schedule a follow up appointment with Dr. Goodwin    If you need to see a pain management doctor you can schedule an appointment with Dr. Martinez at [phone]    Please follow up with your primary care doctor

## 2025-01-30 NOTE — DISCHARGE NOTE NURSING/CASE MANAGEMENT/SOCIAL WORK - NSDCPELOVENOX_GEN_ALL_CORE
Enoxaparin/Lovenox - Compliance/Enoxaparin/Lovenox - Dietary Advice/Enoxaparin/Lovenox - Follow up monitoring/Enoxaparin/Lovenox - Potential for adverse drug reactions and interactions
verbalized name and .

## 2025-02-03 DIAGNOSIS — E78.5 HYPERLIPIDEMIA, UNSPECIFIED: ICD-10-CM

## 2025-02-03 DIAGNOSIS — G47.33 OBSTRUCTIVE SLEEP APNEA (ADULT) (PEDIATRIC): ICD-10-CM

## 2025-02-03 DIAGNOSIS — Z85.3 PERSONAL HISTORY OF MALIGNANT NEOPLASM OF BREAST: ICD-10-CM

## 2025-02-03 DIAGNOSIS — N17.9 ACUTE KIDNEY FAILURE, UNSPECIFIED: ICD-10-CM

## 2025-02-03 DIAGNOSIS — I25.10 ATHEROSCLEROTIC HEART DISEASE OF NATIVE CORONARY ARTERY WITHOUT ANGINA PECTORIS: ICD-10-CM

## 2025-02-03 DIAGNOSIS — M48.062 SPINAL STENOSIS, LUMBAR REGION WITH NEUROGENIC CLAUDICATION: ICD-10-CM

## 2025-02-03 DIAGNOSIS — Z87.11 PERSONAL HISTORY OF PEPTIC ULCER DISEASE: ICD-10-CM

## 2025-02-03 DIAGNOSIS — I95.9 HYPOTENSION, UNSPECIFIED: ICD-10-CM

## 2025-02-03 DIAGNOSIS — Z79.899 OTHER LONG TERM (CURRENT) DRUG THERAPY: ICD-10-CM

## 2025-02-03 DIAGNOSIS — M54.16 RADICULOPATHY, LUMBAR REGION: ICD-10-CM

## 2025-02-03 DIAGNOSIS — I12.9 HYPERTENSIVE CHRONIC KIDNEY DISEASE WITH STAGE 1 THROUGH STAGE 4 CHRONIC KIDNEY DISEASE, OR UNSPECIFIED CHRONIC KIDNEY DISEASE: ICD-10-CM

## 2025-02-03 DIAGNOSIS — M43.16 SPONDYLOLISTHESIS, LUMBAR REGION: ICD-10-CM

## 2025-02-03 DIAGNOSIS — R19.7 DIARRHEA, UNSPECIFIED: ICD-10-CM

## 2025-02-03 DIAGNOSIS — D62 ACUTE POSTHEMORRHAGIC ANEMIA: ICD-10-CM

## 2025-02-03 DIAGNOSIS — Z92.3 PERSONAL HISTORY OF IRRADIATION: ICD-10-CM

## 2025-02-03 DIAGNOSIS — M47.27 OTHER SPONDYLOSIS WITH RADICULOPATHY, LUMBOSACRAL REGION: ICD-10-CM

## 2025-02-03 DIAGNOSIS — E11.22 TYPE 2 DIABETES MELLITUS WITH DIABETIC CHRONIC KIDNEY DISEASE: ICD-10-CM

## 2025-02-03 DIAGNOSIS — Z79.84 LONG TERM (CURRENT) USE OF ORAL HYPOGLYCEMIC DRUGS: ICD-10-CM

## 2025-02-03 DIAGNOSIS — N18.30 CHRONIC KIDNEY DISEASE, STAGE 3 UNSPECIFIED: ICD-10-CM

## 2025-02-03 DIAGNOSIS — M48.07 SPINAL STENOSIS, LUMBOSACRAL REGION: ICD-10-CM

## 2025-02-03 DIAGNOSIS — M19.90 UNSPECIFIED OSTEOARTHRITIS, UNSPECIFIED SITE: ICD-10-CM

## 2025-02-03 DIAGNOSIS — H53.8 OTHER VISUAL DISTURBANCES: ICD-10-CM

## 2025-02-12 ENCOUNTER — APPOINTMENT (OUTPATIENT)
Dept: NEUROSURGERY | Facility: CLINIC | Age: 80
End: 2025-02-12
Payer: MEDICARE

## 2025-02-12 DIAGNOSIS — M47.816 SPONDYLOSIS W/OUT MYELOPATHY OR RADICULOPATHY, LUMBAR REGION: ICD-10-CM

## 2025-02-12 PROCEDURE — 99211 OFF/OP EST MAY X REQ PHY/QHP: CPT | Mod: 24

## 2025-03-20 ENCOUNTER — NON-APPOINTMENT (OUTPATIENT)
Age: 80
End: 2025-03-20

## 2025-03-20 ENCOUNTER — APPOINTMENT (OUTPATIENT)
Dept: NEUROSURGERY | Facility: CLINIC | Age: 80
End: 2025-03-20
Payer: MEDICARE

## 2025-03-20 DIAGNOSIS — M47.816 SPONDYLOSIS W/OUT MYELOPATHY OR RADICULOPATHY, LUMBAR REGION: ICD-10-CM

## 2025-03-20 PROCEDURE — 99024 POSTOP FOLLOW-UP VISIT: CPT

## 2025-05-22 ENCOUNTER — APPOINTMENT (OUTPATIENT)
Dept: NEUROSURGERY | Facility: CLINIC | Age: 80
End: 2025-05-22
Payer: MEDICARE

## 2025-05-22 DIAGNOSIS — M47.816 SPONDYLOSIS W/OUT MYELOPATHY OR RADICULOPATHY, LUMBAR REGION: ICD-10-CM

## 2025-05-22 PROCEDURE — 99212 OFFICE O/P EST SF 10 MIN: CPT | Mod: 93

## 2025-08-21 ENCOUNTER — APPOINTMENT (OUTPATIENT)
Dept: NEUROSURGERY | Facility: CLINIC | Age: 80
End: 2025-08-21
Payer: MEDICARE

## 2025-08-21 VITALS
WEIGHT: 210 LBS | OXYGEN SATURATION: 99 % | DIASTOLIC BLOOD PRESSURE: 87 MMHG | BODY MASS INDEX: 41.23 KG/M2 | HEART RATE: 84 BPM | HEIGHT: 60 IN | SYSTOLIC BLOOD PRESSURE: 148 MMHG

## 2025-08-21 DIAGNOSIS — M47.816 SPONDYLOSIS W/OUT MYELOPATHY OR RADICULOPATHY, LUMBAR REGION: ICD-10-CM

## 2025-08-21 PROCEDURE — 99213 OFFICE O/P EST LOW 20 MIN: CPT

## (undated) DEVICE — NEPTUNE 4-PORT MANIFOLD STANDARD

## (undated) DEVICE — WARMING BLANKET UPPER ADULT

## (undated) DEVICE — CATH IV INTROCAN SAFETY 18G X 1.25" (GREEN) FEP

## (undated) DEVICE — DRAPE INSTRUMENT POUCH 6.75" X 11"

## (undated) DEVICE — PROBE RHYTHMLINK BALL TIP 100MM 2.5

## (undated) DEVICE — PACK SPINE

## (undated) DEVICE — GLV 7.5 DERMAPRENE ULTRA

## (undated) DEVICE — BUR STRYKER CARBIDE MATCH HEAD 3MM

## (undated) DEVICE — NEURO SURGICAL STRIP 1/2 X 6"

## (undated) DEVICE — ELCTR STRYKER NEPTUNE SMOKE EVACUATION PENCIL (GREEN)

## (undated) DEVICE — DRAPE C ARM C-ARMOUR

## (undated) DEVICE — SUT SILK 2-0 30" PSL

## (undated) DEVICE — DRSG TELFA 3 X 8

## (undated) DEVICE — SOL IRR POUR NS 0.9% 1000ML

## (undated) DEVICE — FRAZIER SUCTION TIP 8FR

## (undated) DEVICE — FRAZIER CONNECTING TUBE 2FT 5MM

## (undated) DEVICE — DRAIN JACKSON PRATT 10MM FLAT 3/4 NO TROCAR

## (undated) DEVICE — WOUND IRR SURGIPHOR

## (undated) DEVICE — POSITIONER JACKSON TABLE XODUS

## (undated) DEVICE — BUR STRYKER MULTI FLUTE ROUND 6MM

## (undated) DEVICE — STRYKER BONE MILL & MEDIUM BLADE

## (undated) DEVICE — DRSG TEGADERM 4 X 4.75"

## (undated) DEVICE — SUT VICRYL PLUS 2-0 18" CT-1 (POP-OFF)

## (undated) DEVICE — ELCTR AQUAMANTYS BIPOLAR SEALER 6.0

## (undated) DEVICE — VENODYNE/SCD SLEEVE CALF MEDIUM

## (undated) DEVICE — BIPOLAR FORCEP SYMMETRY BAYONET 7" X 1.5MM SMOOTH (SILVER)

## (undated) DEVICE — Device

## (undated) DEVICE — NDL SPINAL 18G X 3.5" (PINK)